# Patient Record
Sex: FEMALE | Race: WHITE | NOT HISPANIC OR LATINO | Employment: FULL TIME | URBAN - METROPOLITAN AREA
[De-identification: names, ages, dates, MRNs, and addresses within clinical notes are randomized per-mention and may not be internally consistent; named-entity substitution may affect disease eponyms.]

---

## 2017-11-22 LAB
INFLUENZA A ANTIGEN, POC: NEGATIVE
INFLUENZA B ANTIGEN, POC: NEGATIVE

## 2017-11-29 ENCOUNTER — HISTORICAL (OUTPATIENT)
Dept: ADMINISTRATIVE | Facility: HOSPITAL | Age: 62
End: 2017-11-29

## 2017-11-29 LAB
CHOLEST SERPL-MCNC: 155 MG/DL (ref 100–199)
HDLC SERPL-MCNC: 96 MG/DL
LDLC SERPL CALC-MCNC: 48 MG/DL (ref 0–99)
TRIGL SERPL-MCNC: 57 MG/DL (ref 0–149)
VLDLC SERPL CALC-MCNC: 11 MG/DL (ref 5–40)

## 2019-10-07 LAB
BUN SERPL-MCNC: 10 MG/DL (ref 7–18)
CREAT SERPL-MCNC: 1.04 MG/DL (ref 0.6–1.3)
GLUCOSE SERPL-MCNC: 91 MG/DL (ref 74–106)

## 2020-05-26 LAB
BUN SERPL-MCNC: 8.9 MG/DL (ref 9.8–20.1)
CALCIUM SERPL-MCNC: 8.8 MG/DL (ref 8.4–10.2)
CHLORIDE SERPL-SCNC: 106 MMOL/L (ref 98–107)
CO2 SERPL-SCNC: 31 MMOL/L (ref 23–31)
CREAT SERPL-MCNC: 0.89 MG/DL (ref 0.55–1.02)
CREAT/UREA NIT SERPL: 10
ERYTHROCYTE [DISTWIDTH] IN BLOOD BY AUTOMATED COUNT: 13.2 % (ref 11.5–17)
GLUCOSE SERPL-MCNC: 100 MG/DL (ref 82–115)
HCT VFR BLD AUTO: 39.7 % (ref 37–47)
HGB BLD-MCNC: 12.9 GM/DL (ref 12–16)
MCH RBC QN AUTO: 31 PG (ref 27–31)
MCHC RBC AUTO-ENTMCNC: 32.5 GM/DL (ref 33–36)
MCV RBC AUTO: 95.4 FL (ref 80–94)
PLATELET # BLD AUTO: 218 X10(3)/MCL (ref 130–400)
PMV BLD AUTO: 9.7 FL (ref 9.4–12.4)
POTASSIUM SERPL-SCNC: 4 MMOL/L (ref 3.5–5.1)
RBC # BLD AUTO: 4.16 X10(6)/MCL (ref 4.2–5.4)
SODIUM SERPL-SCNC: 143 MMOL/L (ref 136–145)
WBC # SPEC AUTO: 6.1 X10(3)/MCL (ref 4.5–11.5)

## 2020-05-29 ENCOUNTER — HISTORICAL (OUTPATIENT)
Dept: ADMINISTRATIVE | Facility: HOSPITAL | Age: 65
End: 2020-05-29

## 2020-05-29 LAB — SARS-COV-2 RNA RESP QL NAA+PROBE: NOT DETECTED

## 2020-07-16 ENCOUNTER — HISTORICAL (OUTPATIENT)
Dept: ADMINISTRATIVE | Facility: HOSPITAL | Age: 65
End: 2020-07-16

## 2020-09-10 ENCOUNTER — HISTORICAL (OUTPATIENT)
Dept: ADMINISTRATIVE | Facility: HOSPITAL | Age: 65
End: 2020-09-10

## 2021-02-02 ENCOUNTER — HISTORICAL (OUTPATIENT)
Dept: ADMINISTRATIVE | Facility: HOSPITAL | Age: 66
End: 2021-02-02

## 2021-02-02 LAB — VIT B12 SERPL-MCNC: 919 PG/ML (ref 213–816)

## 2021-02-05 ENCOUNTER — HISTORICAL (OUTPATIENT)
Dept: RADIOLOGY | Facility: HOSPITAL | Age: 66
End: 2021-02-05

## 2021-02-05 LAB — POC CREATININE: 1 MG/DL (ref 0.6–1.3)

## 2021-03-08 ENCOUNTER — HISTORICAL (OUTPATIENT)
Dept: ADMINISTRATIVE | Facility: HOSPITAL | Age: 66
End: 2021-03-08

## 2021-03-08 LAB
ABS NEUT (OLG): 2.42 X10(3)/MCL (ref 2.1–9.2)
ALBUMIN SERPL-MCNC: 4.2 GM/DL (ref 3.4–4.8)
ALBUMIN/GLOB SERPL: 1.8 RATIO (ref 1.1–2)
ALP SERPL-CCNC: 45 UNIT/L (ref 40–150)
ALT SERPL-CCNC: 25 UNIT/L (ref 0–55)
APPEARANCE, UA: ABNORMAL
AST SERPL-CCNC: 30 UNIT/L (ref 5–34)
BACTERIA SPEC CULT: ABNORMAL /HPF
BASOPHILS # BLD AUTO: 0 X10(3)/MCL (ref 0–0.2)
BASOPHILS NFR BLD AUTO: 1 %
BILIRUB SERPL-MCNC: 0.5 MG/DL
BILIRUB UR QL STRIP: NEGATIVE
BILIRUBIN DIRECT+TOT PNL SERPL-MCNC: 0.2 MG/DL (ref 0–0.5)
BILIRUBIN DIRECT+TOT PNL SERPL-MCNC: 0.3 MG/DL (ref 0–0.8)
BUN SERPL-MCNC: 9.6 MG/DL (ref 9.8–20.1)
CALCIUM SERPL-MCNC: 9.7 MG/DL (ref 8.4–10.2)
CHLORIDE SERPL-SCNC: 102 MMOL/L (ref 98–107)
CHOLEST SERPL-MCNC: 184 MG/DL
CHOLEST/HDLC SERPL: 2 {RATIO} (ref 0–5)
CO2 SERPL-SCNC: 33 MMOL/L (ref 23–31)
COLOR UR: YELLOW
CREAT SERPL-MCNC: 0.88 MG/DL (ref 0.55–1.02)
EOSINOPHIL # BLD AUTO: 0.2 X10(3)/MCL (ref 0–0.9)
EOSINOPHIL NFR BLD AUTO: 4 %
ERYTHROCYTE [DISTWIDTH] IN BLOOD BY AUTOMATED COUNT: 12.2 % (ref 11.5–17)
GLOBULIN SER-MCNC: 2.4 GM/DL (ref 2.4–3.5)
GLUCOSE (UA): NEGATIVE
GLUCOSE SERPL-MCNC: 86 MG/DL (ref 82–115)
HCT VFR BLD AUTO: 42.1 % (ref 37–47)
HCV AB SERPL QL IA: NONREACTIVE
HDLC SERPL-MCNC: 108 MG/DL (ref 35–60)
HGB BLD-MCNC: 13.7 GM/DL (ref 12–16)
HGB UR QL STRIP: NEGATIVE
KETONES UR QL STRIP: NEGATIVE
LDLC SERPL CALC-MCNC: 70 MG/DL (ref 50–140)
LEUKOCYTE ESTERASE UR QL STRIP: ABNORMAL
LYMPHOCYTES # BLD AUTO: 1.4 X10(3)/MCL (ref 0.6–4.6)
LYMPHOCYTES NFR BLD AUTO: 31 %
MCH RBC QN AUTO: 30.8 PG (ref 27–31)
MCHC RBC AUTO-ENTMCNC: 32.5 GM/DL (ref 33–36)
MCV RBC AUTO: 94.6 FL (ref 80–94)
MONOCYTES # BLD AUTO: 0.4 X10(3)/MCL (ref 0.1–1.3)
MONOCYTES NFR BLD AUTO: 10 %
NEUTROPHILS # BLD AUTO: 2.42 X10(3)/MCL (ref 2.1–9.2)
NEUTROPHILS NFR BLD AUTO: 54 %
NITRITE UR QL STRIP: NEGATIVE
PH UR STRIP: >=9 [PH] (ref 5–9)
PLATELET # BLD AUTO: 210 X10(3)/MCL (ref 130–400)
PMV BLD AUTO: 10.2 FL (ref 9.4–12.4)
POTASSIUM SERPL-SCNC: 4.2 MMOL/L (ref 3.5–5.1)
PROT SERPL-MCNC: 6.6 GM/DL (ref 5.8–7.6)
PROT UR QL STRIP: NEGATIVE
RBC # BLD AUTO: 4.45 X10(6)/MCL (ref 4.2–5.4)
RBC #/AREA URNS HPF: ABNORMAL /[HPF]
SODIUM SERPL-SCNC: 144 MMOL/L (ref 136–145)
SP GR UR STRIP: 1.02 (ref 1–1.03)
SQUAMOUS EPITHELIAL, UA: ABNORMAL
TRIGL SERPL-MCNC: 29 MG/DL (ref 37–140)
TSH SERPL-ACNC: 0.92 UIU/ML (ref 0.35–4.94)
UROBILINOGEN UR STRIP-ACNC: 1
VLDLC SERPL CALC-MCNC: 6 MG/DL
WBC # SPEC AUTO: 4.5 X10(3)/MCL (ref 4.5–11.5)
WBC #/AREA URNS HPF: 7 /HPF (ref 0–3)

## 2021-03-16 ENCOUNTER — HISTORICAL (OUTPATIENT)
Dept: RADIOLOGY | Facility: HOSPITAL | Age: 66
End: 2021-03-16

## 2021-03-18 LAB — BMD RECOMMENDATION EXT: NORMAL

## 2021-03-25 LAB — NONINV COLON CA DNA+OCC BLD SCRN STL QL: NEGATIVE

## 2021-08-02 LAB — BCS RECOMMENDATION EXT: NORMAL

## 2021-08-05 ENCOUNTER — HISTORICAL (OUTPATIENT)
Dept: RADIOLOGY | Facility: HOSPITAL | Age: 66
End: 2021-08-05

## 2021-08-05 LAB — POC CREATININE: 1.1 MG/DL (ref 0.6–1.3)

## 2022-03-16 ENCOUNTER — HISTORICAL (OUTPATIENT)
Dept: ADMINISTRATIVE | Facility: HOSPITAL | Age: 67
End: 2022-03-16

## 2022-03-16 LAB
ABS NEUT (OLG): 3.63 (ref 2.1–9.2)
ALBUMIN SERPL-MCNC: 4.2 G/DL (ref 3.4–4.8)
ALBUMIN/GLOB SERPL: 1.6 {RATIO} (ref 1.1–2)
ALP SERPL-CCNC: 61 U/L (ref 40–150)
ALT SERPL-CCNC: 23 U/L (ref 0–55)
APPEARANCE, UA: CLEAR
AST SERPL-CCNC: 28 U/L (ref 5–34)
BACTERIA SPEC CULT: NORMAL
BASOPHILS # BLD AUTO: 0 10*3/UL (ref 0–0.2)
BASOPHILS NFR BLD AUTO: 0 %
BILIRUB SERPL-MCNC: 0.6 MG/DL
BILIRUB UR QL STRIP: NEGATIVE
BILIRUBIN DIRECT+TOT PNL SERPL-MCNC: 0.2 (ref 0–0.5)
BILIRUBIN DIRECT+TOT PNL SERPL-MCNC: 0.4 (ref 0–0.8)
BUN SERPL-MCNC: 11.8 MG/DL (ref 9.8–20.1)
CALCIUM SERPL-MCNC: 9.8 MG/DL (ref 8.7–10.5)
CHLORIDE SERPL-SCNC: 105 MMOL/L (ref 98–107)
CHOLEST SERPL-MCNC: 173 MG/DL
CHOLEST/HDLC SERPL: 2 {RATIO} (ref 0–5)
CO2 SERPL-SCNC: 30 MMOL/L (ref 23–31)
COLOR UR: YELLOW
CREAT SERPL-MCNC: 0.82 MG/DL (ref 0.55–1.02)
DEPRECATED CALCIDIOL+CALCIFEROL SERPL-MC: 52.6 NG/ML (ref 30–80)
EOSINOPHIL # BLD AUTO: 0.2 10*3/UL (ref 0–0.9)
EOSINOPHIL NFR BLD AUTO: 3 %
ERYTHROCYTE [DISTWIDTH] IN BLOOD BY AUTOMATED COUNT: 12.7 % (ref 11.5–17)
EST. AVERAGE GLUCOSE BLD GHB EST-MCNC: 114 MG/DL
GLOBULIN SER-MCNC: 2.6 G/DL (ref 2.4–3.5)
GLUCOSE (UA): NEGATIVE
GLUCOSE SERPL-MCNC: 80 MG/DL (ref 82–115)
HBA1C MFR BLD: 5.6 %
HCT VFR BLD AUTO: 39.7 % (ref 37–47)
HDLC SERPL-MCNC: 101 MG/DL (ref 35–60)
HEMOLYSIS INTERF INDEX SERPL-ACNC: 4
HGB BLD-MCNC: 13.4 G/DL (ref 12–16)
HGB UR QL STRIP: NEGATIVE
ICTERIC INTERF INDEX SERPL-ACNC: 1
KETONES UR QL STRIP: NEGATIVE
LDLC SERPL CALC-MCNC: 62 MG/DL (ref 50–140)
LEUKOCYTE ESTERASE UR QL STRIP: NORMAL
LIPEMIC INTERF INDEX SERPL-ACNC: <0
LYMPHOCYTES # BLD AUTO: 2.3 10*3/UL (ref 0.6–4.6)
LYMPHOCYTES NFR BLD AUTO: 33 %
MANUAL DIFF? (OHS): NO
MCH RBC QN AUTO: 30.6 PG (ref 27–31)
MCHC RBC AUTO-ENTMCNC: 33.8 G/DL (ref 33–36)
MCV RBC AUTO: 90.6 FL (ref 80–94)
MONOCYTES # BLD AUTO: 0.8 10*3/UL (ref 0.1–1.3)
MONOCYTES NFR BLD AUTO: 11 %
NEUTROPHILS # BLD AUTO: 3.63 10*3/UL (ref 2.1–9.2)
NEUTROPHILS NFR BLD AUTO: 52 %
NITRITE UR QL STRIP: NEGATIVE
PH UR STRIP: 8 [PH] (ref 5–9)
PLATELET # BLD AUTO: 237 10*3/UL (ref 130–400)
PMV BLD AUTO: 10.1 FL (ref 9.4–12.4)
POTASSIUM SERPL-SCNC: 4.2 MMOL/L (ref 3.5–5.1)
PROT SERPL-MCNC: 6.8 G/DL (ref 5.8–7.6)
PROT UR QL STRIP: NEGATIVE
RBC # BLD AUTO: 4.38 10*6/UL (ref 4.2–5.4)
RBC #/AREA URNS HPF: NORMAL /[HPF] (ref 0–2)
SODIUM SERPL-SCNC: 144 MMOL/L (ref 136–145)
SP GR UR STRIP: 1.01 (ref 1–1.03)
SQUAMOUS EPITHELIAL, UA: NORMAL (ref 0–4)
TRIGL SERPL-MCNC: 52 MG/DL (ref 37–140)
TSH SERPL-ACNC: 2.09 M[IU]/L (ref 0.35–4.94)
UROBILINOGEN UR STRIP-ACNC: 0.2
VLDLC SERPL CALC-MCNC: 10 MG/DL
WBC # SPEC AUTO: 6.9 10*3/UL (ref 4.5–11.5)
WBC #/AREA URNS HPF: NORMAL /[HPF] (ref 0–2)

## 2022-04-10 ENCOUNTER — HISTORICAL (OUTPATIENT)
Dept: ADMINISTRATIVE | Facility: HOSPITAL | Age: 67
End: 2022-04-10
Payer: MEDICARE

## 2022-04-29 VITALS
BODY MASS INDEX: 22.92 KG/M2 | HEIGHT: 66 IN | WEIGHT: 142.63 LBS | SYSTOLIC BLOOD PRESSURE: 147 MMHG | DIASTOLIC BLOOD PRESSURE: 94 MMHG

## 2022-04-30 NOTE — OP NOTE
DATE OF SURGERY:    05/29/2020    SURGEON:  Adria Betts MD    PREOPERATIVE DIAGNOSIS:  Left extra-articular distal radius fracture.    POSTOPERATIVE DIAGNOSIS:  Left extra-articular distal radius fracture.    PROCEDURE:  Open reduction, internal fixation left extra-articular distal radius fracture.    ANESTHESIA:  General.    ESTIMATED BLOOD LOSS:  15 cc.    TOURNIQUET TIME:  18 minutes.    IMPLANTS:  Biomet DVR Crosslock plate with locking and nonlocking screws.    ASSISTANT:  Marah Zelaya nurse practitioner, necessary for a skilled set of hands to assist with reduction of the fracture as well as application of the hardware and deep closure.    COMPLICATIONS:  None.    COUNTS:  All counts correct x2 at the end of the case.    INDICATIONS FOR PROCEDURE:  The patient is a 64-year-old female who had a fall onto her outstretched upper extremity walking her dog.  She was seen and evaluated in the emergency department.  She was placed into a splint.  She followed up in clinic.  The risks and benefits of treatment were discussed.  She had a volar displaced extra-articular fracture.  She elected to undergo open reduction, internal fixation of her distal radius fracture.    PROCEDURE IN DETAIL:  After informed consent was obtained, the patient was met in the preoperative holding area.  Her site was marked.  She was taken to the operating room.  She was placed supine on the operating table.  General anesthesia was induced.  All bony prominences were well padded.  Preoperative antibiotics were given.  Her left upper extremity was prepped and draped in a standard sterile fashion.  A timeout was done to indicate the correct operative limb and procedure.  The limb was exsanguinated.  Tourniquet was raised.  A volar approach to the distal radius was performed.  It was carried down through the floor of the FCR tendon sheath, which was retracted ulnarward.  The pronator quadratus was lifted off the distal aspect of  the radius.  The fracture was identified.  Manipulation of the fracture was performed.  It was pulled out to length, restoring her alignment.  It was held in a reduced position by my assistant as a percutaneously applied K-wire was used for provisional stabilization.  A volar plate was applied.  It was positioned.  It was confirmed to be in appropriate position on AP and lateral fluoroscopic images.  A nonlocking screw was placed into the shaft, buttressing against the fracture, holding it in a well reduced position.  A nonlocking screw was placed into the distal segment, bringing the plate down to bone.  Both had excellent purchase.  Multiple locking screws were then placed into the distal segment followed by further shaft screws.  Alignment was confirmed to be appropriate on AP and lateral fluoroscopic images as well as an axial view.  Tourniquet was released.  Hemostasis was obtained.  The wound was irrigated and closed using a 2-0 Vicryl and 3-0 nylon.  Xeroform, 4x4s, cast padding, and a volar resting splint were applied.  The patient was awakened, extubated, and taken to recovery in stable condition.    POSTOPERATIVE PLAN:  She will be discharged home today, nonweightbearing to left upper extremity.  She can keep her limb elevated, perform range of motion of her digits.  We will see her back in 2 weeks.        ______________________________  MD MERLENE Johnson//VENITA  DD:  05/29/2020  Time:  08:10AM  DT:  05/29/2020  Time:  08:52AM  Job #:  [id number]

## 2022-06-09 ENCOUNTER — TELEPHONE (OUTPATIENT)
Dept: FAMILY MEDICINE | Facility: CLINIC | Age: 67
End: 2022-06-09
Payer: MEDICARE

## 2022-06-09 DIAGNOSIS — Z12.31 BREAST CANCER SCREENING BY MAMMOGRAM: Primary | ICD-10-CM

## 2022-06-09 NOTE — TELEPHONE ENCOUNTER
----- Message from Irma Lagos MA sent at 2022  1:38 PM CDT -----  Regarding: FW: MM orders    ----- Message -----  From: Keena Valenzuela  Sent: 2022  12:58 PM CDT  To: [facility] Staff  Subject: MM orders                                        Pt is requesting new MM order to be sent to [facility]. She stated the order sent will  before her visit which is 2022.

## 2022-06-09 NOTE — TELEPHONE ENCOUNTER
Mammo faxed         ----- Message from JASPER Olivares sent at 2022  2:23 PM CDT -----  Regarding: RE: MM orders  Orders placed - please fax  ----- Message -----  From: Irma Lagos MA  Sent: 2022   1:38 PM CDT  To: JASPER Olivares  Subject: FW: MM orders                                      ----- Message -----  From: Keena Valenzuela  Sent: 2022  12:58 PM CDT  To: [facility] Staff  Subject: MM orders                                        Pt is requesting new MM order to be sent to [facility] She stated the order sent will  before her visit which is 2022.

## 2022-07-25 RX ORDER — CARBIDOPA AND LEVODOPA 25; 100 MG/1; MG/1
TABLET ORAL
Qty: 135 TABLET | Refills: 1 | Status: SHIPPED | OUTPATIENT
Start: 2022-07-25 | End: 2022-08-22

## 2022-07-25 NOTE — TELEPHONE ENCOUNTER
Ccan you check to make sure her sinemet refill request just went thought, it said I needed to input her address before it would go through. If not could you add her address?

## 2022-08-02 ENCOUNTER — DOCUMENTATION ONLY (OUTPATIENT)
Dept: ADMINISTRATIVE | Facility: HOSPITAL | Age: 67
End: 2022-08-02
Payer: MEDICARE

## 2022-09-15 ENCOUNTER — HISTORICAL (OUTPATIENT)
Dept: ADMINISTRATIVE | Facility: HOSPITAL | Age: 67
End: 2022-09-15
Payer: MEDICARE

## 2022-09-15 ENCOUNTER — OFFICE VISIT (OUTPATIENT)
Dept: NEUROLOGY | Facility: CLINIC | Age: 67
End: 2022-09-15
Payer: MEDICARE

## 2022-09-15 VITALS
HEART RATE: 76 BPM | WEIGHT: 153 LBS | SYSTOLIC BLOOD PRESSURE: 132 MMHG | HEIGHT: 66 IN | DIASTOLIC BLOOD PRESSURE: 88 MMHG | BODY MASS INDEX: 24.59 KG/M2

## 2022-09-15 DIAGNOSIS — G20.A1 PARKINSON'S DISEASE: Primary | ICD-10-CM

## 2022-09-15 DIAGNOSIS — F51.8 ABNORMAL DREAMS: ICD-10-CM

## 2022-09-15 DIAGNOSIS — R41.89 COGNITIVE IMPAIRMENT: ICD-10-CM

## 2022-09-15 PROCEDURE — 99214 PR OFFICE/OUTPT VISIT, EST, LEVL IV, 30-39 MIN: ICD-10-PCS | Mod: S$PBB,,, | Performed by: PSYCHIATRY & NEUROLOGY

## 2022-09-15 PROCEDURE — 99214 OFFICE O/P EST MOD 30 MIN: CPT | Mod: PBBFAC | Performed by: PSYCHIATRY & NEUROLOGY

## 2022-09-15 PROCEDURE — 99214 OFFICE O/P EST MOD 30 MIN: CPT | Mod: S$PBB,,, | Performed by: PSYCHIATRY & NEUROLOGY

## 2022-09-15 PROCEDURE — 99999 PR PBB SHADOW E&M-EST. PATIENT-LVL IV: CPT | Mod: PBBFAC,,, | Performed by: PSYCHIATRY & NEUROLOGY

## 2022-09-15 PROCEDURE — 99999 PR PBB SHADOW E&M-EST. PATIENT-LVL IV: ICD-10-PCS | Mod: PBBFAC,,, | Performed by: PSYCHIATRY & NEUROLOGY

## 2022-09-15 RX ORDER — DONEPEZIL HYDROCHLORIDE 10 MG/1
1 TABLET, FILM COATED ORAL NIGHTLY
COMMUNITY
Start: 2021-11-30 | End: 2023-12-18 | Stop reason: SDUPTHER

## 2022-09-15 RX ORDER — BREXPIPRAZOLE 2 MG/1
1 TABLET ORAL NIGHTLY
COMMUNITY
Start: 2022-08-30

## 2022-09-15 RX ORDER — CELECOXIB 100 MG/1
100 CAPSULE ORAL 2 TIMES DAILY PRN
COMMUNITY
Start: 2022-08-20

## 2022-09-15 RX ORDER — MEMANTINE HYDROCHLORIDE 10 MG/1
1 TABLET ORAL 2 TIMES DAILY
COMMUNITY
Start: 2021-12-15 | End: 2023-12-18 | Stop reason: SDUPTHER

## 2022-09-15 RX ORDER — ALPRAZOLAM 1 MG/1
1 TABLET ORAL 2 TIMES DAILY PRN
COMMUNITY
Start: 2022-09-01

## 2022-09-15 RX ORDER — FUROSEMIDE 20 MG/1
10-20 TABLET ORAL DAILY PRN
COMMUNITY
Start: 2022-06-15 | End: 2023-04-11 | Stop reason: SDUPTHER

## 2022-09-15 RX ORDER — ACETAMINOPHEN, DIPHENHYDRAMINE HCL, PHENYLEPHRINE HCL 325; 25; 5 MG/1; MG/1; MG/1
1 TABLET ORAL
COMMUNITY

## 2022-09-15 RX ORDER — CARBIDOPA AND LEVODOPA 25; 100 MG/1; MG/1
1.5 TABLET ORAL 3 TIMES DAILY
COMMUNITY
Start: 2021-08-31 | End: 2022-09-16

## 2022-09-15 RX ORDER — DEXTROAMPHETAMINE SACCHARATE, AMPHETAMINE ASPARTATE, DEXTROAMPHETAMINE SULFATE AND AMPHETAMINE SULFATE 5; 5; 5; 5 MG/1; MG/1; MG/1; MG/1
1 TABLET ORAL 3 TIMES DAILY
COMMUNITY
Start: 2022-09-01

## 2022-09-15 NOTE — PROGRESS NOTES
Chief Complaint   Patient presents with    Tremors     At first after increasing Carbidopa/levo to 1.5 tab walking has gotten better, but last couple of weeks tremors to mouth has been bad. Hand writing has gotten a little worse. Finds her walking has slowed down a little bit. Denies any falls or difficulty swallowing. Still having vivid dreams        This is a 66 y.o. female here for follow up for Parkinson's disease, tardive dyskinesia, cognitive impairment.  Patient is doing well.  She takes Sinemet 1.5 tabs 3 times a day which she does feel helps with her tremor.  At times tremor and that lips and jaw is worse.  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  Some days are worse than others.  She does take the Adderall every day although we have instructed her in the past to try to cut down as it could be worsening tremor.  She has noticed that her handwriting is smaller and Messier.  She denies any issues with her walking or balance.    Medication List with Changes/Refills   Current Medications    ALPRAZOLAM (XANAX) 1 MG TABLET    Take 1 mg by mouth nightly as needed.    CARBIDOPA-LEVODOPA  MG (SINEMET)  MG PER TABLET    Take 1.5 tablets by mouth 3 (three) times daily.    CELECOXIB (CELEBREX) 100 MG CAPSULE    Take 100 mg by mouth 2 (two) times daily as needed.    DEXTROAMPHETAMINE-AMPHETAMINE (ADDERALL) 20 MG TABLET    Take 1 tablet by mouth 3 (three) times daily.    DONEPEZIL (ARICEPT) 10 MG TABLET    Take 1 tablet by mouth nightly.    FUROSEMIDE (LASIX) 20 MG TABLET    Take 10-20 mg by mouth daily as needed.    MAGNESIUM ORAL    Take 1 tablet by mouth Daily.    MELATONIN 10 MG TAB    Take 1 tablet by mouth as needed.    MEMANTINE (NAMENDA) 10 MG TAB    Take 1 tablet by mouth 2 (two) times daily.    REXULTI 2 MG TAB    Take 1 tablet by mouth every evening.   Discontinued Medications    CARBIDOPA-LEVODOPA  MG (SINEMET)  MG PER TABLET    TAKE 1 AND 1/2 TABLETS BY  MOUTH 3 TIMES A DAY        Vitals:    09/15/22 0845   BP: 132/88   Pulse: 76        General: alert and oriented, No acute distress, no audible wheezes pulse intact. No edema    Cognition and Comprehension:  Speech and language intact.  follow commands  Cranial Nerves:  II. Optic: Visual fields (Full to confrontation both eyes).  III, IV, VI. Oculomotor: Intact, Pupils equal, round and reactive to light, no nystagmus  V. Trigeminal: Sensation of light touch (Normal)  VII. Facial: no facial asymmetry.  VIII, hearing intact to spoken voice  IX/X. Glossopharyngeal/ Vagus: Voice (normal).  XI. shoulder shrug normal  XII. Hypoglossal: Intact.    Very mild high frequency jaw tremor, worse with distraction during SONYA but also noted infrequently at rest    Muscle Strength & Tone:  Normal upper extremity tone,  Normal lower extremity tone.  Normal upper extremity strength  normal lower extremity strength  No bradykinesia noted in RUE, very mild bradykinesia in LUE  No rest tremor in hands noted today  BUE symmetric high frequency tremor with action  No rigidity BUE    Coordination and Gait  Coordination and gait are normal.  Gait normal     1. Parkinson's disease  Overview:  + RAMIRO scan    Assessment & Plan:  Cont sinemet 1.5 TID  Consider reduce Adderall could be making tremor worse      2. Abnormal dreams  Assessment & Plan:  Move aricept to morning      3. 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 Doing ok on sinemet will continue

## 2022-09-16 ENCOUNTER — DOCUMENTATION ONLY (OUTPATIENT)
Dept: ADMINISTRATIVE | Facility: HOSPITAL | Age: 67
End: 2022-09-16
Payer: MEDICARE

## 2022-09-16 ENCOUNTER — HISTORICAL (OUTPATIENT)
Dept: ADMINISTRATIVE | Facility: HOSPITAL | Age: 67
End: 2022-09-16
Payer: MEDICARE

## 2022-09-16 DIAGNOSIS — G20.A1 PARKINSON'S DISEASE: Primary | ICD-10-CM

## 2022-09-16 LAB — BCS RECOMMENDATION EXT: NORMAL

## 2022-09-16 RX ORDER — CARBIDOPA AND LEVODOPA 25; 100 MG/1; MG/1
TABLET ORAL
Qty: 135 TABLET | Refills: 1 | Status: SHIPPED | OUTPATIENT
Start: 2022-09-16 | End: 2022-10-10 | Stop reason: SDUPTHER

## 2022-10-10 DIAGNOSIS — G20.A1 PARKINSON'S DISEASE: ICD-10-CM

## 2022-10-10 RX ORDER — CARBIDOPA AND LEVODOPA 25; 100 MG/1; MG/1
TABLET ORAL
Qty: 135 TABLET | Refills: 4 | OUTPATIENT
Start: 2022-10-10 | End: 2022-11-30 | Stop reason: SDUPTHER

## 2022-11-30 DIAGNOSIS — G20.A1 PARKINSON'S DISEASE: ICD-10-CM

## 2022-11-30 RX ORDER — CARBIDOPA AND LEVODOPA 25; 100 MG/1; MG/1
TABLET ORAL
Qty: 135 TABLET | Refills: 4 | OUTPATIENT
Start: 2022-11-30 | End: 2023-04-13

## 2023-03-15 ENCOUNTER — OFFICE VISIT (OUTPATIENT)
Dept: FAMILY MEDICINE | Facility: CLINIC | Age: 68
End: 2023-03-15
Payer: MEDICARE

## 2023-03-15 VITALS
BODY MASS INDEX: 24.65 KG/M2 | RESPIRATION RATE: 16 BRPM | OXYGEN SATURATION: 97 % | HEART RATE: 64 BPM | WEIGHT: 153.38 LBS | DIASTOLIC BLOOD PRESSURE: 77 MMHG | TEMPERATURE: 98 F | SYSTOLIC BLOOD PRESSURE: 125 MMHG | HEIGHT: 66 IN

## 2023-03-15 DIAGNOSIS — I25.10 ARTERIOSCLEROSIS OF CORONARY ARTERY: Chronic | ICD-10-CM

## 2023-03-15 DIAGNOSIS — Z78.0 POST-MENOPAUSAL: Chronic | ICD-10-CM

## 2023-03-15 DIAGNOSIS — R73.03 PREDIABETES: ICD-10-CM

## 2023-03-15 DIAGNOSIS — Z12.31 BREAST CANCER SCREENING BY MAMMOGRAM: ICD-10-CM

## 2023-03-15 DIAGNOSIS — Z00.00 WELLNESS EXAMINATION: Primary | ICD-10-CM

## 2023-03-15 DIAGNOSIS — M62.89 MUSCLE STIFFNESS: ICD-10-CM

## 2023-03-15 DIAGNOSIS — F32.A DEPRESSIVE DISORDER: ICD-10-CM

## 2023-03-15 PROBLEM — I34.0 MITRAL VALVE INSUFFICIENCY: Status: ACTIVE | Noted: 2023-03-15

## 2023-03-15 PROBLEM — M85.80 OSTEOPENIA: Status: ACTIVE | Noted: 2023-03-15

## 2023-03-15 PROBLEM — F51.8 ABNORMAL DREAMS: Chronic | Status: ACTIVE | Noted: 2022-09-15

## 2023-03-15 PROBLEM — F02.80 ALZHEIMER'S DISEASE: Status: ACTIVE | Noted: 2023-03-15

## 2023-03-15 PROBLEM — G20.A1 PARKINSON'S DISEASE: Chronic | Status: ACTIVE | Noted: 2022-09-15

## 2023-03-15 PROBLEM — K21.9 GASTROESOPHAGEAL REFLUX DISEASE: Chronic | Status: ACTIVE | Noted: 2023-03-15

## 2023-03-15 PROBLEM — F44.9 DISSOCIATIVE DISORDER: Status: ACTIVE | Noted: 2023-03-15

## 2023-03-15 PROBLEM — F02.80 ALZHEIMER'S DISEASE: Chronic | Status: ACTIVE | Noted: 2023-03-15

## 2023-03-15 PROBLEM — R51.9 HEADACHE: Status: ACTIVE | Noted: 2023-03-15

## 2023-03-15 PROBLEM — F41.1 GENERALIZED ANXIETY DISORDER: Chronic | Status: ACTIVE | Noted: 2023-03-15

## 2023-03-15 PROBLEM — F41.1 GENERALIZED ANXIETY DISORDER: Status: ACTIVE | Noted: 2023-03-15

## 2023-03-15 PROBLEM — R41.89 COGNITIVE IMPAIRMENT: Chronic | Status: ACTIVE | Noted: 2022-09-15

## 2023-03-15 PROBLEM — M50.30 DEGENERATION OF INTERVERTEBRAL DISC OF CERVICAL REGION: Status: ACTIVE | Noted: 2023-03-15

## 2023-03-15 PROBLEM — G30.9 ALZHEIMER'S DISEASE: Status: ACTIVE | Noted: 2023-03-15

## 2023-03-15 PROBLEM — M85.80 OSTEOPENIA: Chronic | Status: ACTIVE | Noted: 2023-03-15

## 2023-03-15 PROBLEM — G30.9 ALZHEIMER'S DISEASE: Chronic | Status: ACTIVE | Noted: 2023-03-15

## 2023-03-15 PROBLEM — M12.9 ARTHROPATHY: Status: ACTIVE | Noted: 2023-03-15

## 2023-03-15 PROBLEM — M50.30 DEGENERATION OF INTERVERTEBRAL DISC OF CERVICAL REGION: Chronic | Status: ACTIVE | Noted: 2023-03-15

## 2023-03-15 PROBLEM — K21.9 GASTROESOPHAGEAL REFLUX DISEASE: Status: ACTIVE | Noted: 2023-03-15

## 2023-03-15 PROBLEM — F44.9 DISSOCIATIVE DISORDER: Chronic | Status: ACTIVE | Noted: 2023-03-15

## 2023-03-15 PROBLEM — I34.0 MITRAL VALVE INSUFFICIENCY: Chronic | Status: ACTIVE | Noted: 2023-03-15

## 2023-03-15 PROBLEM — R51.9 HEADACHE: Chronic | Status: ACTIVE | Noted: 2023-03-15

## 2023-03-15 PROCEDURE — G0439 PPPS, SUBSEQ VISIT: HCPCS | Mod: ,,, | Performed by: STUDENT IN AN ORGANIZED HEALTH CARE EDUCATION/TRAINING PROGRAM

## 2023-03-15 PROCEDURE — G0439 PR MEDICARE ANNUAL WELLNESS SUBSEQUENT VISIT: ICD-10-PCS | Mod: ,,, | Performed by: STUDENT IN AN ORGANIZED HEALTH CARE EDUCATION/TRAINING PROGRAM

## 2023-03-15 RX ORDER — CYCLOBENZAPRINE HCL 5 MG
5 TABLET ORAL NIGHTLY PRN
Qty: 10 TABLET | Refills: 0 | Status: SHIPPED | OUTPATIENT
Start: 2023-03-15 | End: 2023-03-25

## 2023-03-15 NOTE — PROGRESS NOTES
Patient ID: [id number]     Chief Complaint: Medicare AWV Follow Up        HPI:      Disclaimer:  This note is prepared using voice recognition software and as such is likely to have errors despite attempts at proofreading. Please contact me for questions.     Beverly Shah is a 67 y.o. female here today for a Medicare Wellness. No other complaints today.  Overall feeling great.  Recently started physical therapy for Parkinson's disease and feels great.  Quest for some p.r.n. Flexeril because of unknown muscle stiffness due to Parkinson's    A separate E/M code has been provided to evaluate additional complaints that the patient would like addressed during the dedicated Medicare Wellness Exam.      Chronic Issues-    Alzheimer's disease  Was diagnosed almost 5 years ago   Following Dr. Diana Robbins next 9   currently on memantine 10 mg once daily and Aricept 10 mg once nightly  Stable    Parkinson's  Patient is currently on levodopa carbidopa and is stable    Anxiety disorder, unspecified  Bipolar disorder, unspecified  Depression  Currently on Xanax 1 mg p.r.n. nightly, Rexulti 2 mg nightly  Overall feels great denies of any SI/HI    CAD (coronary artery disease)  Has a history of mitral valve prolapse   Following Dr. Matt Oslon   Currently not on any statins.    GERD (gastroesophageal reflux disease)  Stable  Not on protonix    Rheumatoid arthritis, unspecified  DDD (degenerative disc disease), cervical  Following Dr. Miranda    Vitamin D deficiency   Taking over-the-counter vitamin-D supplementations 300 IU every day.    Past Surgical History:   Procedure Laterality Date    Left ankle surgery Right     Left wrist surgery Left        Review of patient's allergies indicates:  No Known Allergies    Outpatient Medications Marked as Taking for the 3/15/23 encounter (Office Visit) with Vanessa Guardado MD   Medication Sig Dispense Refill    ALPRAZolam (XANAX) 1 MG tablet Take 1 mg by mouth nightly as needed.       carbidopa-levodopa  mg (SINEMET)  mg per tablet TAKE 1 AND 1/2 TABLETS BY MOUTH 3 TIMES A DAY  Strength:  mg 135 tablet 4    celecoxib (CELEBREX) 100 MG capsule Take 100 mg by mouth 2 (two) times daily as needed.      dextroamphetamine-amphetamine (ADDERALL) 20 mg tablet Take 1 tablet by mouth 3 (three) times daily.      donepeziL (ARICEPT) 10 MG tablet Take 1 tablet by mouth nightly.      furosemide (LASIX) 20 MG tablet Take 10-20 mg by mouth daily as needed.      MAGNESIUM ORAL Take 1 tablet by mouth Daily.      melatonin 10 mg Tab Take 1 tablet by mouth as needed.      memantine (NAMENDA) 10 MG Tab Take 1 tablet by mouth 2 (two) times daily.      REXULTI 2 mg Tab Take 1 tablet by mouth every evening.         Social History     Socioeconomic History    Marital status:    Tobacco Use    Smoking status: Never     Passive exposure: Never    Smokeless tobacco: Never   Substance and Sexual Activity    Alcohol use: Not Currently    Drug use: Never    Sexual activity: Not Currently        Family History   Problem Relation Age of Onset    Alzheimer's disease Mother     Heart disease Father         Patient Care Team:  Vanessa Guardado MD as PCP - General (Family Medicine)  Vanessa Guardado MD as PCP - Family Medicine (Family Medicine)       Subjective:     ROS    See HPI for details    Constitutional: Denies Change in appetite. Denies Chills. Denies Fever. Denies Night sweats.  Eye: Denies Blurred vision. Denies Discharge. Denies Eye pain.  ENT: Denies Decreased hearing. Denies Sore throat. Denies Swollen glands.  Respiratory: Denies Cough. Denies Shortness of breath. Denies Shortness of breath with exertion. Denies Wheezing.  Cardiovascular: DeniesChest pain at rest. Denies Chest pain with exertion. Denies Irregular heartbeat. Denies Palpitations. Denies Edema.  Gastrointestinal: Denies Abdominal pain. DeniesDiarrhea. Denies Nausea. Denies Vomiting. Denies Hematemesis or  "Hematochezia.  Genitourinary: Denies Dysuria. Denies Urinary frequency. Denies Urinary urgency. Denies Blood in urine.  Endocrine: Denies Cold intolerance. Denies Excessive thirst. Denies Heat intolerance. Denies Weight loss. Denies Weight gain.  Musculoskeletal: Denies Painful joints. Denies Weakness.  Integumentary: Denies Rash. Denies Itching. Denies Dry skin.  Neurologic: Denies Dizziness. Denies Fainting. Denies Headache.  Psychiatric: Denies Depression. Denies Anxiety. Denies Suicidal/Homicidal ideations.    All Other ROS: Negative except as stated in HPI.     Patient Reported Health Risk Assessments:       Objective:     /77 (BP Location: Right arm, Patient Position: Sitting, BP Method: Medium (Automatic))   Pulse 64   Temp 97.9 °F (36.6 °C) (Oral)   Resp 16   Ht 5' 6" (1.676 m)   Wt 69.6 kg (153 lb 6.4 oz)   SpO2 97%   BMI 24.76 kg/m²     Physical Exam    General: Alert and oriented, No acute distress.  Head: Normocephalic, Atraumatic.  Eye: Pupils are equal, round and reactive to light, Extraocular movements are intact, Sclera non-icteric.  Ears/Nose/Throat: Normal, Mucosa moist,Clear.  Neck/Thyroid: Supple, Non-tender, No carotid bruit, No palpable thyromegaly or thyroid nodule, No lymphadenopathy, No JVD, Full range of motion.  Respiratory: Clear to auscultation bilaterally; No wheezes, rales or rhonchi, Non-labored respirations, Symmetrical chest wall expansion.  Cardiovascular: Regular rate and rhythm, S1/S2 normal, No murmurs, rubs or gallops.  Gastrointestinal: Soft, Non-tender, Non-distended, Normal bowel sounds, No palpable organomegaly.  Musculoskeletal: Normal range of motion.  Integumentary: Warm, Dry, Intact, No suspicious lesions or rashes.  Extremities: No clubbing, cyanosis or edema  Neurologic: No focal deficits, Cranial Nerves II-XII are grossly intact, Motor strength normal upper and lower extremities, Sensory exam intact.  Psychiatric: Normal interaction, Coherent speech, " Euthymic mood, Appropriate affect       Assessment:       ICD-10-CM ICD-9-CM   1. Wellness examination  Z00.00 V70.0   2. Depressive disorder  F32.A 311   3. Post-menopausal  Z78.0 V49.81   4. Prediabetes  R73.03 790.29   5. Arteriosclerosis of coronary artery  I25.10 414.00        Plan:       Medicare Annual Wellness and Personalized Prevention Plan:     Fall Risk + Home Safety + Living Situation + Whisper Test + Depression Screen + CAGE + Cognitive Impairment Screen + ADL Screen + Timed Get Up and Go + Nutrition Screen + PAQ Screen + Health Risk Assessment all reviewed.     No flowsheet data found.  Fall Risk Assessment - Outpatient 3/15/2023 9/15/2022   Mobility Status Ambulatory Ambulatory   Number of falls 0 0   Identified as fall risk 0 0       Alcohol/Tobacco Use - Denies of smoking cessation and alcohol intake.  CVD Risk Factors - Reviewed  Obesity/Physical Activity - Normal BMI. Encouraged daily 30 minute physical activity x 5 days per week.    Opioid Screening: Patient medication list reviewed, patient is not taking prescription opioids. Patient is not using additional opioids than prescribed. Patient is at low risk of substance abuse based on this opioid use history.         Health Maintenance Topics with due status: Not Due       Topic Last Completion Date    Colorectal Cancer Screening 03/25/2021    TETANUS VACCINE 02/15/2022    Lipid Panel 03/16/2022    Mammogram 09/16/2022       Vaccinations -   Immunization History   Administered Date(s) Administered    COVID-19, MRNA, LN-S, PF (MODERNA FULL 0.5 ML DOSE) 02/09/2021, 02/09/2021, 03/09/2021, 03/09/2021    COVID-19, MRNA, LN-S, PF (Pfizer) (Gray Cap) 04/06/2022    COVID-19, MRNA, LN-S, PF (Pfizer) (Purple Cap) 11/04/2021    COVID-19, mRNA, LNP-S, bivalent booster, PF (PFIZER OMICRON) 09/28/2022    Influenza 10/29/2007, 10/23/2013    Influenza - Quadrivalent 11/05/2015, 11/05/2015    Influenza - Quadrivalent - High Dose - PF (65 years and older)  09/29/2021, 09/28/2022    Influenza - Quadrivalent - MDCK - PF 10/14/2017, 10/06/2018, 09/28/2019    Influenza - Trivalent - PF (ADULT) 10/29/2007, 10/23/2013, 10/28/2014, 11/21/2016, 10/01/2017    Influenza A (H1N1) 2009 Monovalent - IM 01/12/2010    Pneumococcal Conjugate - 13 Valent 03/11/2021    Pneumococcal Polysaccharide - 23 Valent 01/01/2012, 03/14/2022    Tdap 01/01/2014, 05/24/2020, 02/15/2022    Zoster 10/20/2021, 12/28/2021    Zoster Recombinant 10/20/2021, 12/28/2021        Advance Directives:   Living Will: No        Oral Declaration: No    [place]POST: No    Do Not Resuscitate Status: No (Full Code. Madelaine Castellon- [id number])      Decision Making:  Patient answered questions  Goals of Care: The patient endorses that what is most important right now is to focus on comfort and QOL with grand children     Accordingly, we have decided that the best plan to meet the patient's goals includes continuing with treatment  Advance Care Planning   I attest to discussing Advance Care Planning with patient and/or family member.  Education was provided including the importance of the Health Care Power of , Advance Directives, and/or [place]POST documentation.  The patient expressed understanding to the importance of this information and discussion.  Goals of Care: The patient expressed that what is most important right now is to focus on:   Length of ACP conversation in minutes: 5 min         1. Wellness examination     Lung Cancer-(50-80) Smoker/ Ex smoker- Never smoker  Cervical Cancer Screening (21-65)- Aged out   Breast Cancer Screening (40-74)-Mammogram was wnl . Next due on 9/16/23   Colon Cancer Screening(45-75) - Colonoscopy declined. Color guard was wnl. Next due in 2024  Osteoporosis Screening(>65) - Last DEXA last year at Dr. Miranda.  Reports that she was on Fosamax but Dr. Miranda took her off of Fosamax.  Currently not on any medication.   Eye Exam - Recommend Eye Exam annually   Dental Exam -  Recommend biannually    2. Depressive disorder  Rec to keep scheduled visit with Dr. Robbins  PHQ-9- 3   ER precautions given  - CBC Auto Differential; Future  - Comprehensive Metabolic Panel; Future  - Lipid Panel; Future  - Urinalysis; Future  - Urinalysis    3. Post-menopausal  Will get the DEXA scan reports from Dr. Miranda office   Recommend to start Caltrate OTC    4. Prediabetes  Hemoglobin A1c   Date Value Ref Range Status   03/16/2022 5.6 <=7.0        Follow ADA Diet. Avoid soda, simple sweets, and limit rice/pasta/breads/starches (no more than 45-50 grams per meal).  Maintain healthy weight with goal BMI <30.  Exercise 5 times per week for 30 minutes per day.  Stressed importance of daily foot exams.  Stressed importance of annual dilated eye exam.  - TSH; Future  - Hemoglobin A1C; Future    5. Arteriosclerosis of coronary artery  - Lipid Panel; Future  - keep scheduled follow-up with cardiologist    6. Breast cancer screening by mammogram  - Mammo Digital Screening Bilat w/ Cecil; Future    7. Muscle stiffness  - cyclobenzaprine (FLEXERIL) 5 MG tablet; Take 1 tablet (5 mg total) by mouth nightly as needed for Muscle spasms.  Dispense: 10 tablet; Refill: 0        Medication List with Changes/Refills   Current Medications    ALPRAZOLAM (XANAX) 1 MG TABLET    Take 1 mg by mouth nightly as needed.       Start Date: 9/1/2022  End Date: --    CARBIDOPA-LEVODOPA  MG (SINEMET)  MG PER TABLET    TAKE 1 AND 1/2 TABLETS BY MOUTH 3 TIMES A DAY  Strength:  mg       Start Date: 11/30/2022End Date: --    CARBIDOPA-LEVODOPA  MG (SINEMET)  MG PER TABLET    TAKE 1 AND 1/2 TABLETS BY MOUTH 3 TIMES A DAY       Start Date: 1/23/2023 End Date: --    CELECOXIB (CELEBREX) 100 MG CAPSULE    Take 100 mg by mouth 2 (two) times daily as needed.       Start Date: 8/20/2022 End Date: --    DEXTROAMPHETAMINE-AMPHETAMINE (ADDERALL) 20 MG TABLET    Take 1 tablet by mouth 3 (three) times daily.       Start  Date: 9/1/2022  End Date: --    DONEPEZIL (ARICEPT) 10 MG TABLET    Take 1 tablet by mouth nightly.       Start Date: 11/30/2021End Date: --    FUROSEMIDE (LASIX) 20 MG TABLET    Take 10-20 mg by mouth daily as needed.       Start Date: 6/15/2022 End Date: --    MAGNESIUM ORAL    Take 1 tablet by mouth Daily.       Start Date: 11/16/2021End Date: --    MELATONIN 10 MG TAB    Take 1 tablet by mouth as needed.       Start Date: --        End Date: --    MEMANTINE (NAMENDA) 10 MG TAB    Take 1 tablet by mouth 2 (two) times daily.       Start Date: 12/15/2021End Date: --    REXULTI 2 MG TAB    Take 1 tablet by mouth every evening.       Start Date: 8/30/2022 End Date: --          The patient's Health Maintenance was reviewed and the following appears to be due at this time:   Health Maintenance Due   Topic Date Due    High Dose Statin  Never done    DEXA Scan  03/18/2023           Follow up in about 6 months (around 9/15/2023) for Follow Up depression. 1 year for Aw. In addition to their scheduled follow up, the patient has also been instructed to follow up on as needed basis.     Provided patient with a 5-10 year written screening schedule and personal prevention plan. Recommendations were developed using the USPSTF age appropriate recommendations. Education, counseling, and referrals were provided as needed. After Visit Summary printed and given to patient which includes a list of additional screenings\tests needed.

## 2023-03-16 ENCOUNTER — OFFICE VISIT (OUTPATIENT)
Dept: NEUROLOGY | Facility: CLINIC | Age: 68
End: 2023-03-16
Payer: MEDICARE

## 2023-03-16 VITALS
BODY MASS INDEX: 24.75 KG/M2 | HEART RATE: 72 BPM | WEIGHT: 154 LBS | SYSTOLIC BLOOD PRESSURE: 130 MMHG | HEIGHT: 66 IN | DIASTOLIC BLOOD PRESSURE: 74 MMHG

## 2023-03-16 DIAGNOSIS — R41.89 COGNITIVE IMPAIRMENT: Chronic | ICD-10-CM

## 2023-03-16 DIAGNOSIS — G20.A1 PARKINSON'S DISEASE: Primary | Chronic | ICD-10-CM

## 2023-03-16 PROCEDURE — 99213 OFFICE O/P EST LOW 20 MIN: CPT | Mod: PBBFAC | Performed by: NURSE PRACTITIONER

## 2023-03-16 PROCEDURE — 99999 PR PBB SHADOW E&M-EST. PATIENT-LVL III: ICD-10-PCS | Mod: PBBFAC,,, | Performed by: NURSE PRACTITIONER

## 2023-03-16 PROCEDURE — 99213 PR OFFICE/OUTPT VISIT, EST, LEVL III, 20-29 MIN: ICD-10-PCS | Mod: S$PBB,,, | Performed by: NURSE PRACTITIONER

## 2023-03-16 PROCEDURE — 99213 OFFICE O/P EST LOW 20 MIN: CPT | Mod: S$PBB,,, | Performed by: NURSE PRACTITIONER

## 2023-03-16 PROCEDURE — 99999 PR PBB SHADOW E&M-EST. PATIENT-LVL III: CPT | Mod: PBBFAC,,, | Performed by: NURSE PRACTITIONER

## 2023-03-16 NOTE — PROGRESS NOTES
Subjective:       Patient ID: Beverly Shah is a 67 y.o. female.    Chief Complaint: Tremors (Tremors are okay. States started during [employer] boxing at [facility]. It has helped with her slowness of movement. Mouth tremors are the same. Finds handwriting is a little smaller. ), Abnormal dreams (Since she started taking Aricept in the morning, dreams has decrease.), and Cognitive impairment (States memory is about the same. Denies any agitation. States sleeping well at night.)      History of Present Illness:  Follow-up visit for Parkinson's.  Patient reports she is overall stable since last visit.  She still feels her motor symptoms are well controlled with Sinemet 1.5 tabs t.i.d..  She tries to take her doses at 7:00 a.m., 11:00 a.m., and 3:00 p.m..  Her cardiologist referred her to [facility] and she feels like she is greatly benefitted from that.  She thinks it has specifically helped with her balance.  She does report some difficulty with word fluency.  She says she did talk to the speech therapist at [facility], but she did not think that she required any speech therapy at this time.  They will continue to monitor her.  Recall, she was having issues with vivid dreaming.  She was taking Aricept 10 mg at bedtime so she switched her dose to the morning time in that has resolved the vivid dreaming.  She is still on Adderall which is thought to be a contributor to her tremor in the past.      Review of Systems  I have reviewed a 12 point review of systems which is negative unless otherwise stated in HPI        Past Medical History:   Diagnosis Date    Alzheimer's disease, unspecified (CODE)     Anxiety disorder, unspecified     Bipolar disorder, unspecified     CAD (coronary artery disease)     DDD (degenerative disc disease), cervical     Depression     GERD (gastroesophageal reflux disease)     Parkinson's disease     Rheumatoid arthritis, unspecified     Vitamin D deficiency        Past Surgical  "History:   Procedure Laterality Date    Left ankle surgery Right     Left wrist surgery Left         Family History   Problem Relation Age of Onset    Alzheimer's disease Mother     Heart disease Father         Social History     Socioeconomic History    Marital status:    Tobacco Use    Smoking status: Never     Passive exposure: Never    Smokeless tobacco: Never   Substance and Sexual Activity    Alcohol use: Not Currently    Drug use: Never    Sexual activity: Not Currently        Outpatient Encounter Medications as of 3/16/2023   Medication Sig Dispense Refill    ALPRAZolam (XANAX) 1 MG tablet Take 1 mg by mouth nightly as needed.      carbidopa-levodopa  mg (SINEMET)  mg per tablet TAKE 1 AND 1/2 TABLETS BY MOUTH 3 TIMES A DAY  Strength:  mg 135 tablet 4    celecoxib (CELEBREX) 100 MG capsule Take 100 mg by mouth 2 (two) times daily as needed.      cyclobenzaprine (FLEXERIL) 5 MG tablet Take 1 tablet (5 mg total) by mouth nightly as needed for Muscle spasms. 10 tablet 0    dextroamphetamine-amphetamine (ADDERALL) 20 mg tablet Take 1 tablet by mouth 3 (three) times daily.      donepeziL (ARICEPT) 10 MG tablet Take 1 tablet by mouth nightly.      furosemide (LASIX) 20 MG tablet Take 10-20 mg by mouth daily as needed.      MAGNESIUM ORAL Take 1 tablet by mouth Daily.      melatonin 10 mg Tab Take 1 tablet by mouth as needed.      memantine (NAMENDA) 10 MG Tab Take 1 tablet by mouth 2 (two) times daily.      REXULTI 2 mg Tab Take 1 tablet by mouth every evening.      [DISCONTINUED] carbidopa-levodopa  mg (SINEMET)  mg per tablet TAKE 1 AND 1/2 TABLETS BY MOUTH 3 TIMES A  tablet 1     No facility-administered encounter medications on file as of 3/16/2023.      Objective:   /74 (BP Location: Left arm)   Pulse 72   Ht 5' 6" (1.676 m)   Wt 69.9 kg (154 lb)   BMI 24.86 kg/m²        Physical Exam  Last c/l dose about 1.5 hours prior to exam    General:  Alert and " "oriented  NAD  No overt edema    mild high frequency jaw tremor, worse with distraction during SONYA but also noted infrequently at rest    Cognition and Comprehension:  Speech and language intact  Follows commands    Cranial nerves:   CN 2_ Visual fields (full to confrontation both eyes)  CN 3, 4, 6_ Intact, DANA, no nystagmus  CN 5_facial tactile sensation intact  CN 7_no face asymmetry; normal eye closure and smile  CN 8_hearing intact to spoken voice  CN 9, 10, 11_voice normal, shoulder shrug ok; deltoids not fatigable   CN 12 tongue_protrudes mid line    Muscle Strength and Tone:  Normal upper extremity tone  Normal lower extremity tone  Normal upper extremity strength  Normal lower extremity strength  No bradykinesia noted in RUE, very mild bradykinesia in LUE  No rest tremor in hands noted today  BUE symmetric high frequency tremor with action  No rigidity BUE    Coordination and Gait:  Coordination and gait are normal       Assessment & Plan:      1. Parkinson's disease  Overview:  Initially evaluated September 2020 for memory loss and tremors.  Memory loss had started about 2 years prior and she would already been started on Namenda and Aricept by her PCP.  The tremor she was noticing was noted to be in her jaw and neck.  It started earlier in the year of 2020.  She was noted to be on several psychoactive medications including Luvox, Abilify, and topiramate.  Initial evaluation was consistent with tardive dyskinesia and drug-induced parkinsonism.  She was started on trial of Ingrezza, but did not see any improvement in her symptoms.  A DaTSCAN was ultimately performed and was positive.    Assessment & Plan:  -Patient was "on" during visit today and does seems to be working well.  Continue sinemet 1.5 tabs TID.  Still dyskinetic movements suspicious for TD, but patient did not respond to Ingrezza  continue rise pt      2. Cognitive impairment  Overview:  b12 was 919. 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  Also still on namenda            This note was created with the assistance of voice recognition software. There may be transcription errors as a result of using this technology however minimal. Effort has been made to assure accuracy of transcription but any obvious errors or omissions should be clarified with the author of the document.

## 2023-03-16 NOTE — ASSESSMENT & PLAN NOTE
"-Patient was "on" during visit today and does seems to be working well.  Continue sinemet 1.5 tabs TID.  Still dyskinetic movements suspicious for TD, but patient did not respond to Ingrezza  continue rise pt  "

## 2023-03-23 ENCOUNTER — LAB VISIT (OUTPATIENT)
Dept: LAB | Facility: HOSPITAL | Age: 68
End: 2023-03-23
Attending: STUDENT IN AN ORGANIZED HEALTH CARE EDUCATION/TRAINING PROGRAM
Payer: MEDICARE

## 2023-03-23 DIAGNOSIS — R73.03 PREDIABETES: ICD-10-CM

## 2023-03-23 DIAGNOSIS — F32.A DEPRESSIVE DISORDER: ICD-10-CM

## 2023-03-23 DIAGNOSIS — I25.10 ARTERIOSCLEROSIS OF CORONARY ARTERY: Chronic | ICD-10-CM

## 2023-03-23 LAB
ALBUMIN SERPL-MCNC: 4.3 G/DL (ref 3.4–4.8)
ALBUMIN/GLOB SERPL: 1.5 RATIO (ref 1.1–2)
ALP SERPL-CCNC: 58 UNIT/L (ref 40–150)
ALT SERPL-CCNC: 5 UNIT/L (ref 0–55)
APPEARANCE UR: CLEAR
AST SERPL-CCNC: 24 UNIT/L (ref 5–34)
BACTERIA #/AREA URNS AUTO: NORMAL /HPF
BASOPHILS # BLD AUTO: 0.03 X10(3)/MCL (ref 0–0.2)
BASOPHILS NFR BLD AUTO: 0.5 %
BILIRUB UR QL STRIP.AUTO: NEGATIVE MG/DL
BILIRUBIN DIRECT+TOT PNL SERPL-MCNC: 0.5 MG/DL
BUN SERPL-MCNC: 18.2 MG/DL (ref 9.8–20.1)
CALCIUM SERPL-MCNC: 10.3 MG/DL (ref 8.4–10.2)
CHLORIDE SERPL-SCNC: 105 MMOL/L (ref 98–107)
CHOLEST SERPL-MCNC: 167 MG/DL
CHOLEST/HDLC SERPL: 2 {RATIO} (ref 0–5)
CO2 SERPL-SCNC: 29 MMOL/L (ref 23–31)
COLOR UR AUTO: YELLOW
CREAT SERPL-MCNC: 0.96 MG/DL (ref 0.55–1.02)
EOSINOPHIL # BLD AUTO: 0.13 X10(3)/MCL (ref 0–0.9)
EOSINOPHIL NFR BLD AUTO: 2.3 %
ERYTHROCYTE [DISTWIDTH] IN BLOOD BY AUTOMATED COUNT: 13.2 % (ref 11.5–17)
GFR SERPLBLD CREATININE-BSD FMLA CKD-EPI: >60 MLS/MIN/1.73/M2
GLOBULIN SER-MCNC: 2.8 GM/DL (ref 2.4–3.5)
GLUCOSE SERPL-MCNC: 94 MG/DL (ref 82–115)
GLUCOSE UR QL STRIP.AUTO: NEGATIVE MG/DL
HCT VFR BLD AUTO: 39.8 % (ref 37–47)
HDLC SERPL-MCNC: 92 MG/DL (ref 35–60)
HGB BLD-MCNC: 13.4 G/DL (ref 12–16)
IMM GRANULOCYTES # BLD AUTO: 0.01 X10(3)/MCL (ref 0–0.04)
IMM GRANULOCYTES NFR BLD AUTO: 0.2 %
KETONES UR QL STRIP.AUTO: NEGATIVE MG/DL
LDLC SERPL CALC-MCNC: 62 MG/DL (ref 50–140)
LEUKOCYTE ESTERASE UR QL STRIP.AUTO: ABNORMAL UNIT/L
LYMPHOCYTES # BLD AUTO: 1.83 X10(3)/MCL (ref 0.6–4.6)
LYMPHOCYTES NFR BLD AUTO: 31.9 %
MCH RBC QN AUTO: 30.2 PG
MCHC RBC AUTO-ENTMCNC: 33.7 G/DL (ref 33–36)
MCV RBC AUTO: 89.8 FL (ref 80–94)
MONOCYTES # BLD AUTO: 0.57 X10(3)/MCL (ref 0.1–1.3)
MONOCYTES NFR BLD AUTO: 9.9 %
NEUTROPHILS # BLD AUTO: 3.16 X10(3)/MCL (ref 2.1–9.2)
NEUTROPHILS NFR BLD AUTO: 55.2 %
NITRITE UR QL STRIP.AUTO: NEGATIVE
NRBC BLD AUTO-RTO: 0 %
PH UR STRIP.AUTO: 6 [PH]
PLATELET # BLD AUTO: 237 X10(3)/MCL (ref 130–400)
PMV BLD AUTO: 10 FL (ref 7.4–10.4)
POTASSIUM SERPL-SCNC: 3.7 MMOL/L (ref 3.5–5.1)
PROT SERPL-MCNC: 7.1 GM/DL (ref 5.8–7.6)
PROT UR QL STRIP.AUTO: NEGATIVE MG/DL
RBC # BLD AUTO: 4.43 X10(6)/MCL (ref 4.2–5.4)
RBC #/AREA URNS AUTO: NORMAL /HPF
RBC UR QL AUTO: NEGATIVE UNIT/L
SODIUM SERPL-SCNC: 144 MMOL/L (ref 136–145)
SP GR UR STRIP.AUTO: 1.01 (ref 1–1.03)
SQUAMOUS #/AREA URNS AUTO: NORMAL /HPF
TRIGL SERPL-MCNC: 66 MG/DL (ref 37–140)
TSH SERPL-ACNC: 1.17 UIU/ML (ref 0.35–4.94)
UROBILINOGEN UR STRIP-ACNC: 0.2 MG/DL
VLDLC SERPL CALC-MCNC: 13 MG/DL
WBC # SPEC AUTO: 5.7 X10(3)/MCL (ref 4.5–11.5)
WBC #/AREA URNS AUTO: NORMAL /HPF

## 2023-03-23 PROCEDURE — 80061 LIPID PANEL: CPT

## 2023-03-23 PROCEDURE — 80053 COMPREHEN METABOLIC PANEL: CPT

## 2023-03-23 PROCEDURE — 83036 HEMOGLOBIN GLYCOSYLATED A1C: CPT

## 2023-03-23 PROCEDURE — 36415 COLL VENOUS BLD VENIPUNCTURE: CPT

## 2023-03-23 PROCEDURE — 85025 COMPLETE CBC W/AUTO DIFF WBC: CPT

## 2023-03-23 PROCEDURE — 84443 ASSAY THYROID STIM HORMONE: CPT

## 2023-03-24 LAB
EST. AVERAGE GLUCOSE BLD GHB EST-MCNC: 105.4 MG/DL
HBA1C MFR BLD: 5.3 %

## 2023-03-28 DIAGNOSIS — E83.52 HYPERCALCEMIA: Primary | ICD-10-CM

## 2023-03-29 ENCOUNTER — TELEPHONE (OUTPATIENT)
Dept: FAMILY MEDICINE | Facility: CLINIC | Age: 68
End: 2023-03-29
Payer: MEDICARE

## 2023-03-29 NOTE — PROGRESS NOTES
Please inform patient of lab results.      Please inform the following regarding Blood work regarding Hypercalcemia ( increased Calcium level in Blood)     1) Stop taking OTC Vit D and Calcium, Hydrochlorothiazide, Biotin  supplementations if taking x 1 week  2) Recommend to Increase Hydration  3)Repeat blood work and Calcium lab orders are in computer. Please take the blood work after 1 week from today.    Other labwork within acceptable ranges.    Thanks,    Dr. Guardado

## 2023-03-29 NOTE — TELEPHONE ENCOUNTER
----- Message from Vanessa Guardado MD sent at 3/28/2023  7:49 PM CDT -----  Please inform patient of lab results.      Please inform the following regarding Blood work regarding Hypercalcemia ( increased Calcium level in Blood)     1) Stop taking OTC Vit D and Calcium, Hydrochlorothiazide, Biotin  supplementations if taking x 1 week  2) Recommend to Increase Hydration  3)Repeat blood work and Calcium lab orders are in computer. Please take the blood work after 1 week from today.    Other labwork within acceptable ranges.    Thanks,    Dr. Guardado

## 2023-04-05 ENCOUNTER — LAB VISIT (OUTPATIENT)
Dept: LAB | Facility: HOSPITAL | Age: 68
End: 2023-04-05
Attending: STUDENT IN AN ORGANIZED HEALTH CARE EDUCATION/TRAINING PROGRAM
Payer: MEDICARE

## 2023-04-05 DIAGNOSIS — E83.52 HYPERCALCEMIA: ICD-10-CM

## 2023-04-05 DIAGNOSIS — N17.9 AKI (ACUTE KIDNEY INJURY): Primary | ICD-10-CM

## 2023-04-05 LAB
ALBUMIN SERPL-MCNC: 4.3 G/DL (ref 3.4–4.8)
ALBUMIN/GLOB SERPL: 1.5 RATIO (ref 1.1–2)
ALP SERPL-CCNC: 65 UNIT/L (ref 40–150)
ALT SERPL-CCNC: 25 UNIT/L (ref 0–55)
AST SERPL-CCNC: 27 UNIT/L (ref 5–34)
BILIRUBIN DIRECT+TOT PNL SERPL-MCNC: 0.5 MG/DL
BUN SERPL-MCNC: 14.5 MG/DL (ref 9.8–20.1)
CALCIUM SERPL-MCNC: 10 MG/DL (ref 8.4–10.2)
CHLORIDE SERPL-SCNC: 106 MMOL/L (ref 98–107)
CO2 SERPL-SCNC: 30 MMOL/L (ref 23–31)
CREAT SERPL-MCNC: 1.05 MG/DL (ref 0.55–1.02)
DEPRECATED CALCIDIOL+CALCIFEROL SERPL-MC: 56.8 NG/ML (ref 30–80)
GFR SERPLBLD CREATININE-BSD FMLA CKD-EPI: 58 MLS/MIN/1.73/M2
GLOBULIN SER-MCNC: 2.9 GM/DL (ref 2.4–3.5)
GLUCOSE SERPL-MCNC: 92 MG/DL (ref 82–115)
POTASSIUM SERPL-SCNC: 4.2 MMOL/L (ref 3.5–5.1)
PROT SERPL-MCNC: 7.2 GM/DL (ref 5.8–7.6)
PTH-INTACT SERPL-MCNC: 73.2 PG/ML (ref 8.7–77)
SODIUM SERPL-SCNC: 142 MMOL/L (ref 136–145)

## 2023-04-05 PROCEDURE — 82330 ASSAY OF CALCIUM: CPT | Mod: 90

## 2023-04-05 PROCEDURE — 82306 VITAMIN D 25 HYDROXY: CPT

## 2023-04-05 PROCEDURE — 83970 ASSAY OF PARATHORMONE: CPT

## 2023-04-05 PROCEDURE — 36415 COLL VENOUS BLD VENIPUNCTURE: CPT

## 2023-04-05 PROCEDURE — 80053 COMPREHEN METABOLIC PANEL: CPT

## 2023-04-05 NOTE — PROGRESS NOTES
Please inform patient of lab results.    1. Brandan ( acute kidney injury close window  BRANDAN recs  Increase Hydration  Avoid Nephrotoxic medications like NSAIDS/ Celebrex  Please confirm if the patient took new antibiotics or recreational drugs ,dehydration, diarrhea, vomiting, acute illness, hospitalization, recent angiograms or exposure to IV radiocontrast.  Hold Celebrex as of now until the renal function are back to baseline  Monitor BP and CBG closely   Please get checked in 3 days ( after 4/7/23) of increasing hydration and  holding ACEI/  NSAIDS      Thanks,    Dr. Guardado

## 2023-04-06 ENCOUNTER — TELEPHONE (OUTPATIENT)
Dept: FAMILY MEDICINE | Facility: CLINIC | Age: 68
End: 2023-04-06
Payer: MEDICARE

## 2023-04-06 LAB — CA-I ADJ PH7.4 SERPL ISE-MCNC: 5.3 MG/DL (ref 4.57–5.43)

## 2023-04-06 NOTE — TELEPHONE ENCOUNTER
----- Message from Vanessa Gaurdado MD sent at 4/5/2023  1:15 PM CDT -----  Please inform patient of lab results.    1. Brandan ( acute kidney injury close window  BRANDAN recs  Increase Hydration  Avoid Nephrotoxic medications like NSAIDS/ Celebrex  Please confirm if the patient took new antibiotics or recreational drugs ,dehydration, diarrhea, vomiting, acute illness, hospitalization, recent angiograms or exposure to IV radiocontrast.  Hold Celebrex as of now until the renal function are back to baseline  Monitor BP and CBG closely   Please get checked in 3 days ( after 4/7/23) of increasing hydration and  holding ACEI/  NSAIDS      Thanks,    Dr. Guardado

## 2023-04-10 ENCOUNTER — TELEPHONE (OUTPATIENT)
Dept: FAMILY MEDICINE | Facility: CLINIC | Age: 68
End: 2023-04-10
Payer: MEDICARE

## 2023-04-10 NOTE — TELEPHONE ENCOUNTER
Attempted to call patient about results.    NA Detailed message left explaining result to patient. Encouraged patient to call back within th message if she had any questions, comments, or concerns.

## 2023-04-11 ENCOUNTER — TELEPHONE (OUTPATIENT)
Dept: FAMILY MEDICINE | Facility: CLINIC | Age: 68
End: 2023-04-11
Payer: MEDICARE

## 2023-04-11 DIAGNOSIS — M62.89 MUSCLE STIFFNESS: Primary | ICD-10-CM

## 2023-04-11 DIAGNOSIS — M62.89 MUSCLE STIFFNESS: ICD-10-CM

## 2023-04-11 RX ORDER — FUROSEMIDE 20 MG/1
10-20 TABLET ORAL DAILY PRN
Qty: 30 TABLET | Refills: 3 | Status: SHIPPED | OUTPATIENT
Start: 2023-04-11 | End: 2023-05-11 | Stop reason: SDUPTHER

## 2023-04-11 RX ORDER — CYCLOBENZAPRINE HCL 10 MG
5 TABLET ORAL 3 TIMES DAILY PRN
Qty: 30 TABLET | Refills: 0 | Status: SHIPPED | OUTPATIENT
Start: 2023-04-11 | End: 2023-04-13 | Stop reason: SDUPTHER

## 2023-04-13 DIAGNOSIS — M62.89 MUSCLE STIFFNESS: ICD-10-CM

## 2023-04-13 RX ORDER — CYCLOBENZAPRINE HCL 10 MG
5 TABLET ORAL 3 TIMES DAILY PRN
Qty: 30 TABLET | Refills: 0 | Status: SHIPPED | OUTPATIENT
Start: 2023-04-13 | End: 2023-04-23

## 2023-05-11 RX ORDER — CYCLOBENZAPRINE HCL 10 MG
10 TABLET ORAL 3 TIMES DAILY PRN
Qty: 30 TABLET | Refills: 3 | Status: SHIPPED | OUTPATIENT
Start: 2023-05-11 | End: 2023-10-23 | Stop reason: SDUPTHER

## 2023-05-11 RX ORDER — CYCLOBENZAPRINE HCL 10 MG
10 TABLET ORAL 3 TIMES DAILY PRN
COMMUNITY
Start: 2023-04-29 | End: 2023-05-11 | Stop reason: SDUPTHER

## 2023-05-11 RX ORDER — FUROSEMIDE 20 MG/1
10-20 TABLET ORAL DAILY PRN
Qty: 30 TABLET | Refills: 3 | Status: SHIPPED | OUTPATIENT
Start: 2023-05-11 | End: 2023-10-23 | Stop reason: SDUPTHER

## 2023-07-10 ENCOUNTER — TELEPHONE (OUTPATIENT)
Dept: FAMILY MEDICINE | Facility: CLINIC | Age: 68
End: 2023-07-10
Payer: MEDICARE

## 2023-07-10 NOTE — TELEPHONE ENCOUNTER
----- Message from Marichuy Valdez sent at 7/10/2023  3:17 PM CDT -----  .Type:  Needs Medical Advice    Who Called: pt    Would the patient rather a call back or a response via [facility]? Marcel back   Best Call Back Number: [phone]  Additional Information: needs a referral to [facility]  breast center appointment is 10/05/2023   breast center appointment is 10/05/2023

## 2023-07-11 ENCOUNTER — TELEPHONE (OUTPATIENT)
Dept: FAMILY MEDICINE | Facility: CLINIC | Age: 68
End: 2023-07-11
Payer: MEDICARE

## 2023-07-11 DIAGNOSIS — Z12.39 ENCOUNTER FOR BREAST CANCER SCREENING USING NON-MAMMOGRAM MODALITY: Primary | ICD-10-CM

## 2023-07-11 NOTE — TELEPHONE ENCOUNTER
----- Message from Dodie Zarate sent at 7/11/2023  1:48 PM CDT -----  Regarding: Request  .Caller is requesting to schedule their Lab appointment prior to annual appointment.  Order is not listed in EPIC.  Please enter order and contact patient to schedule.    Name of Caller:Barb    Where would they like the lab performed?Breast center [facility]    Would the patient rather a call back or a response via [facility]?     Best Call Back Number:[phone] fax [phone]    Additional Information:Need orders for breast ultrasound sent to [phone]

## 2023-07-31 DIAGNOSIS — G20.A1 PARKINSON'S DISEASE: ICD-10-CM

## 2023-07-31 RX ORDER — CARBIDOPA AND LEVODOPA 25; 100 MG/1; MG/1
TABLET ORAL
Qty: 405 TABLET | Refills: 1 | Status: SHIPPED | OUTPATIENT
Start: 2023-07-31 | End: 2023-11-14 | Stop reason: SDUPTHER

## 2023-08-14 ENCOUNTER — DOCUMENTATION ONLY (OUTPATIENT)
Dept: FAMILY MEDICINE | Facility: CLINIC | Age: 68
End: 2023-08-14
Payer: MEDICARE

## 2023-09-15 ENCOUNTER — OFFICE VISIT (OUTPATIENT)
Dept: FAMILY MEDICINE | Facility: CLINIC | Age: 68
End: 2023-09-15
Payer: MEDICARE

## 2023-09-15 VITALS
SYSTOLIC BLOOD PRESSURE: 112 MMHG | HEIGHT: 66 IN | RESPIRATION RATE: 17 BRPM | HEART RATE: 75 BPM | BODY MASS INDEX: 25.55 KG/M2 | DIASTOLIC BLOOD PRESSURE: 72 MMHG | TEMPERATURE: 98 F | WEIGHT: 159 LBS | OXYGEN SATURATION: 97 %

## 2023-09-15 DIAGNOSIS — F02.80 ALZHEIMER'S DISEASE: ICD-10-CM

## 2023-09-15 DIAGNOSIS — F32.A DEPRESSIVE DISORDER: ICD-10-CM

## 2023-09-15 DIAGNOSIS — I25.10 CORONARY ARTERY DISEASE INVOLVING NATIVE HEART WITHOUT ANGINA PECTORIS, UNSPECIFIED VESSEL OR LESION TYPE: ICD-10-CM

## 2023-09-15 DIAGNOSIS — Z12.11 ENCOUNTER FOR SCREENING FOR MALIGNANT NEOPLASM OF COLON: ICD-10-CM

## 2023-09-15 DIAGNOSIS — G20.A1 PARKINSON'S DISEASE: ICD-10-CM

## 2023-09-15 DIAGNOSIS — E78.2 MIXED HYPERLIPIDEMIA: ICD-10-CM

## 2023-09-15 DIAGNOSIS — F31.9 BIPOLAR I DISORDER: ICD-10-CM

## 2023-09-15 DIAGNOSIS — F41.1 GENERALIZED ANXIETY DISORDER: ICD-10-CM

## 2023-09-15 DIAGNOSIS — G30.9 ALZHEIMER'S DISEASE: ICD-10-CM

## 2023-09-15 DIAGNOSIS — R79.9 ABNORMAL FINDING OF BLOOD CHEMISTRY, UNSPECIFIED: ICD-10-CM

## 2023-09-15 PROCEDURE — 99213 PR OFFICE/OUTPT VISIT, EST, LEVL III, 20-29 MIN: ICD-10-PCS | Mod: ,,, | Performed by: STUDENT IN AN ORGANIZED HEALTH CARE EDUCATION/TRAINING PROGRAM

## 2023-09-15 PROCEDURE — 99213 OFFICE O/P EST LOW 20 MIN: CPT | Mod: ,,, | Performed by: STUDENT IN AN ORGANIZED HEALTH CARE EDUCATION/TRAINING PROGRAM

## 2023-09-15 RX ORDER — ATORVASTATIN CALCIUM 10 MG/1
10 TABLET, FILM COATED ORAL DAILY
Qty: 90 TABLET | Refills: 3 | Status: SHIPPED | OUTPATIENT
Start: 2023-09-15 | End: 2024-09-14

## 2023-09-15 NOTE — PROGRESS NOTES
Patient ID: [id number]     Chief Complaint: Depression (6 MTH f/u)        HPI:      Disclaimer:  This note is prepared using voice recognition software and as such is likely to have errors despite attempts at proofreading. Please contact me for questions.     Beverly Shah is a 67 y.o. female here today for the following    Acute Issues-  Alzheimer's disease  Was diagnosed almost 5 years ago   Memory remains the same  Sees Dr. Diana Robbins    Currently on memantine 10 mg once daily and Aricept 10 mg once nightly  Stable    Parkinson's  Patient is currently on levodopa carbidopa and is stable    Anxiety disorder, unspecified  Bipolar disorder, unspecified  Depression  Currently on Xanax 1 mg p.r.n. nightly, Rexulti 2 mg nightly  Overall feels great denies of any SI/HI  Well controlled    CAD (coronary artery disease)  Has a history of mitral valve prolapse   Following Dr. Matt Olson   Currently not on any statins.    GERD (gastroesophageal reflux disease)  Stable  Not on protonix    Rheumatoid arthritis, unspecified  DDD (degenerative disc disease), cervical  Following Dr. Miranda    Vitamin D deficiency   Taking over-the-counter vitamin-D supplementations 300 IU every day.    ADHD  On Adderall   Seeing Dr. Mayuri Spears   Chronic Issues-    ----------------------------  Alzheimer's disease, unspecified (CODE)  Anxiety disorder, unspecified  Bipolar disorder, unspecified  CAD (coronary artery disease)  DDD (degenerative disc disease), cervical  Depression  GERD (gastroesophageal reflux disease)  Parkinson's disease  Rheumatoid arthritis, unspecified  Vitamin D deficiency     Past Surgical History:   Procedure Laterality Date    Left ankle surgery Right     Left wrist surgery Left        Review of patient's allergies indicates:  No Known Allergies    Current Outpatient Medications   Medication Instructions    ALPRAZolam (XANAX) 1 mg, Oral, Nightly PRN    carbidopa-levodopa  mg (SINEMET)  mg per  tablet TAKE 1 AND 1/2 TABLETS BY MOUTH 3 TIMES A DAY    celecoxib (CELEBREX) 100 mg, Oral, 2 times daily PRN    cyclobenzaprine (FLEXERIL) 10 mg, Oral, 3 times daily PRN    dextroamphetamine-amphetamine (ADDERALL) 20 mg tablet 1 tablet, Oral, 3 times daily    donepeziL (ARICEPT) 10 MG tablet 1 tablet, Oral, Nightly    furosemide (LASIX) 10-20 mg, Oral, Daily PRN    MAGNESIUM ORAL 1 tablet, Oral, Daily    melatonin 10 mg Tab 1 tablet, Oral, As needed (PRN)    memantine (NAMENDA) 10 MG Tab 1 tablet, Oral, 2 times daily    REXULTI 2 mg Tab 1 tablet, Oral, Nightly       Social History     Socioeconomic History    Marital status:    Tobacco Use    Smoking status: Never     Passive exposure: Never    Smokeless tobacco: Never   Substance and Sexual Activity    Alcohol use: Not Currently    Drug use: Never    Sexual activity: Not Currently        Family History   Problem Relation Age of Onset    Alzheimer's disease Mother     Heart disease Father         Patient Care Team:  Vanessa Guardado MD as PCP - General (Family Medicine)  Vanessa Guardado MD as PCP - Family Medicine (Family Medicine)     Subjective:     ROS    See HPI for details    Constitutional: Denies Change in appetite. Denies Chills. Denies Fever. Denies Night sweats.  Respiratory: Denies Cough. Denies Shortness of breath. Denies Shortness of breath with exertion. Denies Wheezing.  Cardiovascular: DeniesChest pain at rest. Denies Chest pain with exertion. Denies Irregular heartbeat. Denies Palpitations. Denies Edema.  Gastrointestinal: Denies Abdominal pain. DeniesDiarrhea. Denies Nausea. Denies Vomiting. Denies Hematemesis or Hematochezia.  Genitourinary: Denies Dysuria. Denies Urinary frequency. Denies Urinary urgency. Denies Blood in urine.  Endocrine: Denies Cold intolerance. Denies Excessive thirst. Denies Heat intolerance. Denies Weight loss. Denies Weight gain.  Musculoskeletal: Denies Painful joints. Denies Weakness.  Integumentary: Denies  "Rash. Denies Itching. Denies Dry skin.  Neurologic: Denies Dizziness. Denies Fainting. Denies Headache.  Psychiatric: Denies Depression. Denies Anxiety. Denies Suicidal/Homicidal ideations.    All Other ROS: Negative except as stated in HPI.  Objective:     Visit Vitals  /72 (BP Location: Right arm, Patient Position: Sitting, BP Method: Large (Manual))   Pulse 75   Temp 98.3 °F (36.8 °C) (Oral)   Resp 17   Ht 5' 6" (1.676 m)   Wt 72.1 kg (159 lb)   SpO2 97%   BMI 25.66 kg/m²       Physical Exam    General: Alert and oriented, No acute distress.  Respiratory: Clear to auscultation bilaterally; No wheezes, rales or rhonchi,Non-labored respirations, Symmetrical chest wall expansion.  Cardiovascular: Regular rate and rhythm, S1/S2 normal, No murmurs, rubs or gallops.  Gastrointestinal: Soft, Non-tender, Non-distended, Normal bowel sounds, No palpable organomegaly.  Musculoskeletal: Normal range of motion.  Extremities: No clubbing, cyanosis or edema  Neurologic: No focal deficits  Psychiatric: Normal interaction, Coherent speech, Euthymic mood, Appropriate affect   Assessment:       ICD-10-CM ICD-9-CM   1. Bipolar I disorder  F31.9 296.7   2. Alzheimer's disease  G30.9 331.0    F02.80    3. Encounter for screening for malignant neoplasm of colon  Z12.11 V76.51   4. Encounter for lipid screening for cardiovascular disease  Z13.220 V77.91    Z13.6 V81.2   5. Abnormal finding of blood chemistry, unspecified  R79.9 790.6   6. Mixed hyperlipidemia  E78.2 272.2   7. Parkinson's disease  G20 332.0   8. Generalized anxiety disorder  F41.1 300.02   9. Depressive disorder  F32.A 311   10. Coronary artery disease involving native heart without angina pectoris, unspecified vessel or lesion type  I25.10 414.01       Plan:     1. Bipolar I disorder  4. Generalized anxiety disorder  5. Depressive disorder  Continue  on Xanax 1 mg p.r.n. nightly, Rexulti 2 mg nightly    2. Alzheimer's disease  -     CBC Auto Differential; Future; " Expected date: 03/15/2024  -     Comprehensive Metabolic Panel; Future; Expected date: 03/15/2024  -     Lipid Panel; Future; Expected date: 03/15/2024  -     TSH; Future; Expected date: 03/15/2024  -     Hemoglobin A1C; Future; Expected date: 03/15/2024  -     Urinalysis; Future; Expected date: 03/15/2024  Keep Scheduled visit with Primary  Continue memantine and Aricept as prescribed    3. Parkinson's disease  Continue levodopa/carbidopa as prescribed    6. Coronary artery disease involving native heart without angina pectoris, unspecified vessel or lesion type  Keep scheduled follow up with Dr. Matt Olson  Keep goal LDL less than 70, goal blood pressure less than 130/80, goal A1c less than 7   Continue 35 minutes of brisk walking with Mediterranean diet    7. Encounter for screening for malignant neoplasm of colon  -     Cologuard Screening (Multitarget Stool DNA); Future; Expected date: 03/26/2024    8. Abnormal finding of blood chemistry, unspecified  -     Hemoglobin A1C; Future; Expected date: 03/15/2024    9. Mixed hyperlipidemia  -     Lipid Panel; Future; Expected date: 03/15/2024  -     atorvastatin (LIPITOR) 10 MG tablet; Take 1 tablet (10 mg total) by mouth once daily.  Dispense: 90 tablet; Refill: 3    Medication List with Changes/Refills   Current Medications    ALPRAZOLAM (XANAX) 1 MG TABLET    Take 1 mg by mouth nightly as needed.       Start Date: 9/1/2022  End Date: --    CARBIDOPA-LEVODOPA  MG (SINEMET)  MG PER TABLET    TAKE 1 AND 1/2 TABLETS BY MOUTH 3 TIMES A DAY       Start Date: 7/31/2023 End Date: --    CELECOXIB (CELEBREX) 100 MG CAPSULE    Take 100 mg by mouth 2 (two) times daily as needed.       Start Date: 8/20/2022 End Date: --    CYCLOBENZAPRINE (FLEXERIL) 10 MG TABLET    Take 1 tablet (10 mg total) by mouth 3 (three) times daily as needed for Muscle spasms.       Start Date: 5/11/2023 End Date: --    DEXTROAMPHETAMINE-AMPHETAMINE (ADDERALL) 20 MG TABLET    Take 1 tablet  by mouth 3 (three) times daily.       Start Date: 9/1/2022  End Date: --    DONEPEZIL (ARICEPT) 10 MG TABLET    Take 1 tablet by mouth nightly.       Start Date: 11/30/2021End Date: --    FUROSEMIDE (LASIX) 20 MG TABLET    Take 0.5-1 tablets (10-20 mg total) by mouth daily as needed (to prevent swelling).       Start Date: 5/11/2023 End Date: --    MAGNESIUM ORAL    Take 1 tablet by mouth Daily.       Start Date: 11/16/2021End Date: --    MELATONIN 10 MG TAB    Take 1 tablet by mouth as needed.       Start Date: --        End Date: --    MEMANTINE (NAMENDA) 10 MG TAB    Take 1 tablet by mouth 2 (two) times daily.       Start Date: 12/15/2021End Date: --    REXULTI 2 MG TAB    Take 1 tablet by mouth every evening.       Start Date: 8/30/2022 End Date: --          Follow up for Annual Wellness - keep scheduled. .   In addition to their scheduled follow up, the patient has also been instructed to follow up on as needed basis.

## 2023-10-05 LAB — BCS RECOMMENDATION EXT: NORMAL

## 2023-10-09 ENCOUNTER — DOCUMENTATION ONLY (OUTPATIENT)
Dept: FAMILY MEDICINE | Facility: CLINIC | Age: 68
End: 2023-10-09
Payer: MEDICARE

## 2023-10-09 ENCOUNTER — TELEPHONE (OUTPATIENT)
Dept: FAMILY MEDICINE | Facility: CLINIC | Age: 68
End: 2023-10-09
Payer: MEDICARE

## 2023-10-09 NOTE — TELEPHONE ENCOUNTER
----- Message from Vanessa Guardado MD sent at 10/9/2023 10:40 AM CDT -----  Normal MMG. Repeat in 1 year.

## 2023-10-23 RX ORDER — FUROSEMIDE 20 MG/1
10-20 TABLET ORAL DAILY PRN
Qty: 30 TABLET | Refills: 3 | Status: SHIPPED | OUTPATIENT
Start: 2023-10-23 | End: 2023-12-11 | Stop reason: SDUPTHER

## 2023-10-23 RX ORDER — CYCLOBENZAPRINE HCL 10 MG
10 TABLET ORAL 3 TIMES DAILY PRN
Qty: 30 TABLET | Refills: 0 | Status: SHIPPED | OUTPATIENT
Start: 2023-10-23 | End: 2023-11-20 | Stop reason: SDUPTHER

## 2023-11-14 ENCOUNTER — OFFICE VISIT (OUTPATIENT)
Dept: NEUROLOGY | Facility: CLINIC | Age: 68
End: 2023-11-14
Payer: MEDICARE

## 2023-11-14 VITALS
WEIGHT: 160 LBS | BODY MASS INDEX: 25.71 KG/M2 | SYSTOLIC BLOOD PRESSURE: 136 MMHG | DIASTOLIC BLOOD PRESSURE: 78 MMHG | HEIGHT: 66 IN

## 2023-11-14 DIAGNOSIS — G20.A1 PARKINSON'S DISEASE WITHOUT DYSKINESIA OR FLUCTUATING MANIFESTATIONS: Primary | Chronic | ICD-10-CM

## 2023-11-14 DIAGNOSIS — G20.A1 PARKINSON'S DISEASE: ICD-10-CM

## 2023-11-14 PROCEDURE — 99213 PR OFFICE/OUTPT VISIT, EST, LEVL III, 20-29 MIN: ICD-10-PCS | Mod: S$PBB,,, | Performed by: PSYCHIATRY & NEUROLOGY

## 2023-11-14 PROCEDURE — 99999 PR PBB SHADOW E&M-EST. PATIENT-LVL III: CPT | Mod: PBBFAC,,, | Performed by: PSYCHIATRY & NEUROLOGY

## 2023-11-14 PROCEDURE — 99213 OFFICE O/P EST LOW 20 MIN: CPT | Mod: PBBFAC | Performed by: PSYCHIATRY & NEUROLOGY

## 2023-11-14 PROCEDURE — 99999 PR PBB SHADOW E&M-EST. PATIENT-LVL III: ICD-10-PCS | Mod: PBBFAC,,, | Performed by: PSYCHIATRY & NEUROLOGY

## 2023-11-14 PROCEDURE — 99213 OFFICE O/P EST LOW 20 MIN: CPT | Mod: S$PBB,,, | Performed by: PSYCHIATRY & NEUROLOGY

## 2023-11-14 RX ORDER — CARBIDOPA AND LEVODOPA 25; 100 MG/1; MG/1
TABLET ORAL
Qty: 405 TABLET | Refills: 2 | Status: SHIPPED | OUTPATIENT
Start: 2023-11-14

## 2023-11-14 NOTE — PROGRESS NOTES
Subjective:       Patient ID: Beverly Shah is a 67 y.o. female.    Chief Complaint: Parkinson's disease (6 month follow up for Parkinson's disease. Patient states no change in tremors. Doing well on sinemet. Stopped going to physical therapy about a month ago. No change in memory. No other neurological complaints.)      History of Present Illness:  Follow-up visit for Parkinson's.  Patient reports she is overall stable since last visit.  She still feels her motor symptoms are well controlled with Sinemet 1.5 tabs t.i.d..  She tries to take her doses at 7:00 a.m., 11:00 a.m., and 3:00 p.m..  Her cardiologist referred her to [facility] and she feels like she is greatly benefitted from that.  She thinks it has specifically helped with her balance.  She does report some difficulty with word fluency.  She says she did talk to the speech therapist at [facility], but she did not think that she required any speech therapy at this time.  They will continue to monitor her.  Recall, she was having issues with vivid dreaming.  She was taking Aricept 10 mg at bedtime so she switched her dose to the morning time in that has resolved the vivid dreaming.  She is still on Adderall which is thought to be a contributor to her tremor in the past.    Works at [employer] no work performance issues.     Review of Systems  I have reviewed a 12 point review of systems which is negative unless otherwise stated in HPI        Past Medical History:   Diagnosis Date    Alzheimer's disease, unspecified (CODE)     Anxiety disorder, unspecified     Bipolar disorder, unspecified     CAD (coronary artery disease)     DDD (degenerative disc disease), cervical     Depression     GERD (gastroesophageal reflux disease)     Parkinson's disease     Rheumatoid arthritis, unspecified     Vitamin D deficiency        Past Surgical History:   Procedure Laterality Date    Left ankle surgery Right     Left wrist surgery Left         Family History   Problem Relation Age  of Onset    Alzheimer's disease Mother     Heart disease Father         Social History     Socioeconomic History    Marital status:    Tobacco Use    Smoking status: Never     Passive exposure: Never    Smokeless tobacco: Never   Substance and Sexual Activity    Alcohol use: Not Currently    Drug use: Never    Sexual activity: Not Currently        Outpatient Encounter Medications as of 11/14/2023   Medication Sig Dispense Refill    ALPRAZolam (XANAX) 1 MG tablet Take 1 mg by mouth nightly as needed.      atorvastatin (LIPITOR) 10 MG tablet Take 1 tablet (10 mg total) by mouth once daily. 90 tablet 3    carbidopa-levodopa  mg (SINEMET)  mg per tablet TAKE 1 AND 1/2 TABLETS BY MOUTH 3 TIMES A  tablet 1    celecoxib (CELEBREX) 100 MG capsule Take 100 mg by mouth 2 (two) times daily as needed.      cyclobenzaprine (FLEXERIL) 10 MG tablet Take 1 tablet (10 mg total) by mouth 3 (three) times daily as needed for Muscle spasms. 30 tablet 0    dextroamphetamine-amphetamine (ADDERALL) 20 mg tablet Take 1 tablet by mouth 3 (three) times daily.      donepeziL (ARICEPT) 10 MG tablet Take 1 tablet by mouth nightly.      furosemide (LASIX) 20 MG tablet Take 0.5-1 tablets (10-20 mg total) by mouth daily as needed (to prevent swelling). 30 tablet 3    MAGNESIUM ORAL Take 1 tablet by mouth Daily.      melatonin 10 mg Tab Take 1 tablet by mouth as needed.      memantine (NAMENDA) 10 MG Tab Take 1 tablet by mouth 2 (two) times daily.      REXULTI 2 mg Tab Take 1 tablet by mouth every evening.      [DISCONTINUED] cyclobenzaprine (FLEXERIL) 10 MG tablet Take 1 tablet (10 mg total) by mouth 3 (three) times daily as needed for Muscle spasms. 30 tablet 3    [DISCONTINUED] furosemide (LASIX) 20 MG tablet Take 0.5-1 tablets (10-20 mg total) by mouth daily as needed (to prevent swelling). 30 tablet 3     No facility-administered encounter medications on file as of 11/14/2023.      Objective:   /78 (BP Location:  "Right arm, Patient Position: Sitting)   Ht 5' 6" (1.676 m)   Wt 72.6 kg (160 lb)   BMI 25.82 kg/m²        Physical Exam  Last c/l dose about 1.5 hours prior to exam    General:  Alert and oriented  NAD  No overt edema    mild high frequency jaw tremor, worse with distraction during SONYA but also noted infrequently at rest    Cognition and Comprehension:  Speech and language intact  Follows commands    Cranial nerves:   CN 2_ Visual fields (full to confrontation both eyes)  CN 3, 4, 6_ Intact, DANA, no nystagmus  CN 5_facial tactile sensation intact  CN 7_no face asymmetry; normal eye closure and smile  CN 8_hearing intact to spoken voice  CN 9, 10, 11_voice normal, shoulder shrug ok; deltoids not fatigable   CN 12 tongue_protrudes mid line    Muscle Strength and Tone:  Normal upper extremity tone  Normal lower extremity tone  Normal upper extremity strength  Normal lower extremity strength  No bradykinesia noted in RUE, very mild bradykinesia in LUE  No rest tremor in hands noted today  BUE symmetric high frequency tremor with action  No rigidity BUE    Coordination and Gait:  Coordination and gait are normal       Assessment & Plan:      1. Parkinson's disease without dyskinesia or fluctuating manifestations  Overview:  Initially evaluated September 2020 for memory loss and tremors.  Memory loss had started about 2 years prior and she would already been started on Namenda and Aricept by her PCP.  The tremor she was noticing was noted to be in her jaw and neck.  It started earlier in the year of 2020.  She was noted to be on several psychoactive medications including Luvox, Abilify, and topiramate.  Initial evaluation was consistent with tardive dyskinesia and drug-induced parkinsonism.  She was started on trial of Ingrezza, but did not see any improvement in her symptoms.  A DaTSCAN was ultimately performed and was positive.        Doing well, cont sinemet      This note was created with the assistance of voice " recognition software. There may be transcription errors as a result of using this technology however minimal. Effort has been made to assure accuracy of transcription but any obvious errors or omissions should be clarified with the author of the document.

## 2023-11-20 RX ORDER — CYCLOBENZAPRINE HCL 10 MG
10 TABLET ORAL 3 TIMES DAILY PRN
Qty: 30 TABLET | Refills: 0 | Status: SHIPPED | OUTPATIENT
Start: 2023-11-20 | End: 2023-12-19 | Stop reason: SDUPTHER

## 2023-12-11 RX ORDER — FUROSEMIDE 20 MG/1
10-20 TABLET ORAL DAILY PRN
Qty: 30 TABLET | Refills: 3 | Status: SHIPPED | OUTPATIENT
Start: 2023-12-11 | End: 2024-02-27 | Stop reason: SDUPTHER

## 2023-12-18 RX ORDER — DONEPEZIL HYDROCHLORIDE 10 MG/1
10 TABLET, FILM COATED ORAL NIGHTLY
Qty: 30 TABLET | Refills: 0 | Status: SHIPPED | OUTPATIENT
Start: 2023-12-18 | End: 2024-01-16 | Stop reason: SDUPTHER

## 2023-12-18 RX ORDER — MEMANTINE HYDROCHLORIDE 10 MG/1
10 TABLET ORAL DAILY
Qty: 30 TABLET | Refills: 0 | Status: SHIPPED | OUTPATIENT
Start: 2023-12-18 | End: 2024-01-16 | Stop reason: SDUPTHER

## 2023-12-18 NOTE — TELEPHONE ENCOUNTER
----- Message from Dodie Zarate sent at 12/18/2023  8:46 AM CST -----  .Type:  RX Refill Request    Who Called: Beverly  Refill or New Rx:Refill  RX Name and Strength:memantine (NAMENDA) 10 MG Tab  How is the patient currently taking it? (ex. 1XDay):2/ day  Is this a 30 day or 90 day RX:30  Preferred Pharmacy with phone number:[facility] or Mail Order:Local  Ordering Provider:Debby  Would the patient rather a call back or a response via [facility]?   Best Call Back Number:[phone]  Additional Information: memantine (NAMENDA) 10 MG Tab       .Type:  RX Refill Request    Who Called: Beverly  Refill or New Rx:Refill  RX Name and Strength:donepeziL (ARICEPT) 10 MG tablet  How is the patient currently taking it? (ex. 1XDay):1/ day  Is this a 30 day or 90 day RX:30  Preferred Pharmacy with phone number:[facility]  Local or Mail Order:Local  Ordering Provider:Debby  Would the patient rather a call back or a response via [facility]?   Best Call Back Number:[phone]  Additional Information: donepeziL (ARICEPT) 10 MG tablet

## 2023-12-19 DIAGNOSIS — M62.89 MUSCLE STIFFNESS: Primary | ICD-10-CM

## 2023-12-19 RX ORDER — CYCLOBENZAPRINE HCL 10 MG
10 TABLET ORAL 3 TIMES DAILY PRN
Qty: 30 TABLET | Refills: 0 | Status: SHIPPED | OUTPATIENT
Start: 2023-12-19 | End: 2024-01-16 | Stop reason: SINTOL

## 2024-01-16 DIAGNOSIS — G30.9 ALZHEIMER'S DISEASE: Primary | ICD-10-CM

## 2024-01-16 DIAGNOSIS — F02.80 ALZHEIMER'S DISEASE: Primary | ICD-10-CM

## 2024-01-16 RX ORDER — MEMANTINE HYDROCHLORIDE 10 MG/1
10 TABLET ORAL DAILY
Qty: 90 TABLET | Refills: 0 | Status: SHIPPED | OUTPATIENT
Start: 2024-01-16 | End: 2024-03-11 | Stop reason: SDUPTHER

## 2024-01-16 RX ORDER — DONEPEZIL HYDROCHLORIDE 10 MG/1
10 TABLET, FILM COATED ORAL NIGHTLY
Qty: 30 TABLET | Refills: 0 | Status: SHIPPED | OUTPATIENT
Start: 2024-01-16 | End: 2024-02-27 | Stop reason: SDUPTHER

## 2024-01-22 DIAGNOSIS — M62.89 MUSCLE STIFFNESS: Primary | ICD-10-CM

## 2024-01-22 RX ORDER — CYCLOBENZAPRINE HCL 10 MG
10 TABLET ORAL 3 TIMES DAILY PRN
COMMUNITY
End: 2024-01-22 | Stop reason: SDUPTHER

## 2024-01-22 RX ORDER — CYCLOBENZAPRINE HCL 10 MG
10 TABLET ORAL 3 TIMES DAILY PRN
Qty: 30 TABLET | Refills: 0 | Status: SHIPPED | OUTPATIENT
Start: 2024-01-22 | End: 2024-02-26 | Stop reason: SDUPTHER

## 2024-02-19 ENCOUNTER — LAB VISIT (OUTPATIENT)
Dept: LAB | Facility: HOSPITAL | Age: 69
End: 2024-02-19
Attending: INTERNAL MEDICINE
Payer: MEDICARE

## 2024-02-19 DIAGNOSIS — I35.0 AORTIC VALVE STENOSIS, ETIOLOGY OF CARDIAC VALVE DISEASE UNSPECIFIED: Primary | ICD-10-CM

## 2024-02-19 DIAGNOSIS — I25.118 ATHSCL HEART DISEASE OF NATIVE COR ART W OTH ANG PCTRS: ICD-10-CM

## 2024-02-19 DIAGNOSIS — Z82.49 FAMILY HISTORY OF ISCHEMIC HEART DISEASE: ICD-10-CM

## 2024-02-19 DIAGNOSIS — I34.1 MITRAL VALVE PROLAPSE: ICD-10-CM

## 2024-02-19 DIAGNOSIS — R94.31 NONSPECIFIC ABNORMAL ELECTROCARDIOGRAM (ECG) (EKG): ICD-10-CM

## 2024-02-19 DIAGNOSIS — I27.21 PULMONARY ARTERY HYPERTENSION: ICD-10-CM

## 2024-02-19 LAB
ALBUMIN SERPL-MCNC: 4 G/DL (ref 3.4–4.8)
ALBUMIN/GLOB SERPL: 1.5 RATIO (ref 1.1–2)
ALP SERPL-CCNC: 57 UNIT/L (ref 40–150)
ALT SERPL-CCNC: 32 UNIT/L (ref 0–55)
AST SERPL-CCNC: 30 UNIT/L (ref 5–34)
BILIRUB SERPL-MCNC: 0.3 MG/DL
BUN SERPL-MCNC: 15.1 MG/DL (ref 9.8–20.1)
CALCIUM SERPL-MCNC: 10.1 MG/DL (ref 8.4–10.2)
CHLORIDE SERPL-SCNC: 108 MMOL/L (ref 98–107)
CHOLEST SERPL-MCNC: 150 MG/DL
CHOLEST/HDLC SERPL: 2 {RATIO} (ref 0–5)
CO2 SERPL-SCNC: 30 MMOL/L (ref 23–31)
CREAT SERPL-MCNC: 0.94 MG/DL (ref 0.55–1.02)
GFR SERPLBLD CREATININE-BSD FMLA CKD-EPI: >60 MLS/MIN/1.73/M2
GLOBULIN SER-MCNC: 2.7 GM/DL (ref 2.4–3.5)
GLUCOSE SERPL-MCNC: 102 MG/DL (ref 82–115)
HDLC SERPL-MCNC: 73 MG/DL (ref 35–60)
LDLC SERPL CALC-MCNC: 59 MG/DL (ref 50–140)
POTASSIUM SERPL-SCNC: 3.8 MMOL/L (ref 3.5–5.1)
PROT SERPL-MCNC: 6.7 GM/DL (ref 5.8–7.6)
SODIUM SERPL-SCNC: 145 MMOL/L (ref 136–145)
TRIGL SERPL-MCNC: 91 MG/DL (ref 37–140)
VLDLC SERPL CALC-MCNC: 18 MG/DL

## 2024-02-19 PROCEDURE — 36415 COLL VENOUS BLD VENIPUNCTURE: CPT

## 2024-02-19 PROCEDURE — 80061 LIPID PANEL: CPT

## 2024-02-19 PROCEDURE — 80053 COMPREHEN METABOLIC PANEL: CPT

## 2024-02-22 ENCOUNTER — TELEPHONE (OUTPATIENT)
Dept: FAMILY MEDICINE | Facility: CLINIC | Age: 69
End: 2024-02-22
Payer: MEDICARE

## 2024-02-26 ENCOUNTER — TELEPHONE (OUTPATIENT)
Dept: FAMILY MEDICINE | Facility: CLINIC | Age: 69
End: 2024-02-26
Payer: MEDICARE

## 2024-02-26 DIAGNOSIS — M62.89 MUSCLE STIFFNESS: ICD-10-CM

## 2024-02-26 RX ORDER — CYCLOBENZAPRINE HCL 10 MG
10 TABLET ORAL 3 TIMES DAILY PRN
Qty: 30 TABLET | Refills: 0 | Status: SHIPPED | OUTPATIENT
Start: 2024-02-26

## 2024-02-27 RX ORDER — DONEPEZIL HYDROCHLORIDE 10 MG/1
10 TABLET, FILM COATED ORAL NIGHTLY
Qty: 30 TABLET | Refills: 0 | Status: SHIPPED | OUTPATIENT
Start: 2024-02-27 | End: 2024-04-01 | Stop reason: SDUPTHER

## 2024-02-27 RX ORDER — GLIPIZIDE 10 MG/1
10 TABLET ORAL
COMMUNITY
End: 2024-02-27 | Stop reason: SDUPTHER

## 2024-02-27 RX ORDER — FUROSEMIDE 20 MG/1
10-20 TABLET ORAL DAILY PRN
Qty: 90 TABLET | Refills: 0 | Status: SHIPPED | OUTPATIENT
Start: 2024-02-27 | End: 2024-05-24 | Stop reason: SDUPTHER

## 2024-02-27 RX ORDER — GLIPIZIDE 10 MG/1
10 TABLET ORAL
Qty: 60 TABLET | Refills: 0 | Status: SHIPPED | OUTPATIENT
Start: 2024-02-27 | End: 2024-03-22 | Stop reason: SDUPTHER

## 2024-02-27 NOTE — TELEPHONE ENCOUNTER
Patient called needing refill on medications.    Was not sure of donepezil Aricept, considering it is  does that mean patient has to schedule a visit in order to have it refilled? Just wanted to re-confirm. Thanks!

## 2024-03-11 ENCOUNTER — LAB VISIT (OUTPATIENT)
Dept: LAB | Facility: HOSPITAL | Age: 69
End: 2024-03-11
Attending: STUDENT IN AN ORGANIZED HEALTH CARE EDUCATION/TRAINING PROGRAM
Payer: MEDICARE

## 2024-03-11 DIAGNOSIS — E78.2 MIXED HYPERLIPIDEMIA: ICD-10-CM

## 2024-03-11 DIAGNOSIS — F02.80 ALZHEIMER'S DISEASE: ICD-10-CM

## 2024-03-11 DIAGNOSIS — G30.9 ALZHEIMER'S DISEASE: ICD-10-CM

## 2024-03-11 DIAGNOSIS — R79.9 ABNORMAL FINDING OF BLOOD CHEMISTRY, UNSPECIFIED: ICD-10-CM

## 2024-03-11 LAB
ALBUMIN SERPL-MCNC: 4.2 G/DL (ref 3.4–4.8)
ALBUMIN/GLOB SERPL: 1.5 RATIO (ref 1.1–2)
ALP SERPL-CCNC: 49 UNIT/L (ref 40–150)
ALT SERPL-CCNC: 32 UNIT/L (ref 0–55)
AST SERPL-CCNC: 31 UNIT/L (ref 5–34)
BASOPHILS # BLD AUTO: 0.03 X10(3)/MCL
BASOPHILS NFR BLD AUTO: 0.6 %
BILIRUB SERPL-MCNC: 0.6 MG/DL
BUN SERPL-MCNC: 13.3 MG/DL (ref 9.8–20.1)
CALCIUM SERPL-MCNC: 10 MG/DL (ref 8.4–10.2)
CHLORIDE SERPL-SCNC: 106 MMOL/L (ref 98–107)
CHOLEST SERPL-MCNC: 157 MG/DL
CHOLEST/HDLC SERPL: 2 {RATIO} (ref 0–5)
CO2 SERPL-SCNC: 29 MMOL/L (ref 23–31)
CREAT SERPL-MCNC: 0.91 MG/DL (ref 0.55–1.02)
EOSINOPHIL # BLD AUTO: 0.17 X10(3)/MCL (ref 0–0.9)
EOSINOPHIL NFR BLD AUTO: 3.4 %
ERYTHROCYTE [DISTWIDTH] IN BLOOD BY AUTOMATED COUNT: 12.6 % (ref 11.5–17)
EST. AVERAGE GLUCOSE BLD GHB EST-MCNC: 105.4 MG/DL
GFR SERPLBLD CREATININE-BSD FMLA CKD-EPI: >60 MLS/MIN/1.73/M2
GLOBULIN SER-MCNC: 2.8 GM/DL (ref 2.4–3.5)
GLUCOSE SERPL-MCNC: 50 MG/DL (ref 82–115)
HBA1C MFR BLD: 5.3 %
HCT VFR BLD AUTO: 44.6 % (ref 37–47)
HDLC SERPL-MCNC: 73 MG/DL (ref 35–60)
HGB BLD-MCNC: 15 G/DL (ref 12–16)
IMM GRANULOCYTES # BLD AUTO: 0.01 X10(3)/MCL (ref 0–0.04)
IMM GRANULOCYTES NFR BLD AUTO: 0.2 %
LDLC SERPL CALC-MCNC: 70 MG/DL (ref 50–140)
LYMPHOCYTES # BLD AUTO: 2.09 X10(3)/MCL (ref 0.6–4.6)
LYMPHOCYTES NFR BLD AUTO: 41.3 %
MCH RBC QN AUTO: 30.4 PG (ref 27–31)
MCHC RBC AUTO-ENTMCNC: 33.6 G/DL (ref 33–36)
MCV RBC AUTO: 90.3 FL (ref 80–94)
MONOCYTES # BLD AUTO: 0.49 X10(3)/MCL (ref 0.1–1.3)
MONOCYTES NFR BLD AUTO: 9.7 %
NEUTROPHILS # BLD AUTO: 2.27 X10(3)/MCL (ref 2.1–9.2)
NEUTROPHILS NFR BLD AUTO: 44.8 %
NRBC BLD AUTO-RTO: 0 %
PLATELET # BLD AUTO: 244 X10(3)/MCL (ref 130–400)
PMV BLD AUTO: 9.5 FL (ref 7.4–10.4)
POTASSIUM SERPL-SCNC: 3.7 MMOL/L (ref 3.5–5.1)
PROT SERPL-MCNC: 7 GM/DL (ref 5.8–7.6)
RBC # BLD AUTO: 4.94 X10(6)/MCL (ref 4.2–5.4)
SODIUM SERPL-SCNC: 146 MMOL/L (ref 136–145)
TRIGL SERPL-MCNC: 69 MG/DL (ref 37–140)
TSH SERPL-ACNC: 1.27 UIU/ML (ref 0.35–4.94)
VLDLC SERPL CALC-MCNC: 14 MG/DL
WBC # SPEC AUTO: 5.06 X10(3)/MCL (ref 4.5–11.5)

## 2024-03-11 PROCEDURE — 80061 LIPID PANEL: CPT

## 2024-03-11 PROCEDURE — 80053 COMPREHEN METABOLIC PANEL: CPT

## 2024-03-11 PROCEDURE — 84443 ASSAY THYROID STIM HORMONE: CPT

## 2024-03-11 PROCEDURE — 83036 HEMOGLOBIN GLYCOSYLATED A1C: CPT

## 2024-03-11 PROCEDURE — 36415 COLL VENOUS BLD VENIPUNCTURE: CPT

## 2024-03-11 PROCEDURE — 85025 COMPLETE CBC W/AUTO DIFF WBC: CPT

## 2024-03-11 RX ORDER — MEMANTINE HYDROCHLORIDE 10 MG/1
10 TABLET ORAL DAILY
Qty: 30 TABLET | Refills: 0 | Status: SHIPPED | OUTPATIENT
Start: 2024-03-11 | End: 2024-03-13 | Stop reason: SDUPTHER

## 2024-03-11 NOTE — TELEPHONE ENCOUNTER
Called/Attempted to call patient in regards to appointment on 3/18/2024 for Medicare wellness.    Left message reminding patient of appointment. The prerequisites were already done. Encouraged the patient to call us back if they have any further questions, comments, or concerns.

## 2024-03-13 DIAGNOSIS — G30.9 ALZHEIMER'S DISEASE: ICD-10-CM

## 2024-03-13 DIAGNOSIS — F02.80 ALZHEIMER'S DISEASE: ICD-10-CM

## 2024-03-13 RX ORDER — MEMANTINE HYDROCHLORIDE 10 MG/1
10 TABLET ORAL DAILY
Qty: 90 TABLET | Refills: 0 | Status: SHIPPED | OUTPATIENT
Start: 2024-03-13 | End: 2024-04-17 | Stop reason: SDUPTHER

## 2024-03-18 ENCOUNTER — OFFICE VISIT (OUTPATIENT)
Dept: FAMILY MEDICINE | Facility: CLINIC | Age: 69
End: 2024-03-18
Payer: MEDICARE

## 2024-03-18 ENCOUNTER — DOCUMENTATION ONLY (OUTPATIENT)
Dept: FAMILY MEDICINE | Facility: CLINIC | Age: 69
End: 2024-03-18

## 2024-03-18 VITALS
WEIGHT: 150.19 LBS | TEMPERATURE: 98 F | BODY MASS INDEX: 24.14 KG/M2 | RESPIRATION RATE: 17 BRPM | DIASTOLIC BLOOD PRESSURE: 70 MMHG | HEIGHT: 66 IN | SYSTOLIC BLOOD PRESSURE: 120 MMHG | OXYGEN SATURATION: 99 % | HEART RATE: 96 BPM

## 2024-03-18 DIAGNOSIS — F33.0 MILD EPISODE OF RECURRENT MAJOR DEPRESSIVE DISORDER: ICD-10-CM

## 2024-03-18 DIAGNOSIS — Z12.11 ENCOUNTER FOR SCREENING FOR MALIGNANT NEOPLASM OF COLON: ICD-10-CM

## 2024-03-18 DIAGNOSIS — M85.80 OSTEOPENIA, UNSPECIFIED LOCATION: Chronic | ICD-10-CM

## 2024-03-18 DIAGNOSIS — G20.A1 PARKINSON'S DISEASE WITHOUT DYSKINESIA OR FLUCTUATING MANIFESTATIONS: ICD-10-CM

## 2024-03-18 DIAGNOSIS — Z00.00 WELLNESS EXAMINATION: ICD-10-CM

## 2024-03-18 DIAGNOSIS — F41.1 GENERALIZED ANXIETY DISORDER: Chronic | ICD-10-CM

## 2024-03-18 DIAGNOSIS — G30.9 ALZHEIMER'S DISEASE: ICD-10-CM

## 2024-03-18 DIAGNOSIS — R73.09 OTHER ABNORMAL GLUCOSE: ICD-10-CM

## 2024-03-18 DIAGNOSIS — F31.9 BIPOLAR I DISORDER: ICD-10-CM

## 2024-03-18 DIAGNOSIS — Z12.31 BREAST CANCER SCREENING BY MAMMOGRAM: ICD-10-CM

## 2024-03-18 DIAGNOSIS — I25.10 ARTERIOSCLEROSIS OF CORONARY ARTERY: Chronic | ICD-10-CM

## 2024-03-18 DIAGNOSIS — F02.80 ALZHEIMER'S DISEASE: ICD-10-CM

## 2024-03-18 DIAGNOSIS — E78.2 MIXED HYPERLIPIDEMIA: ICD-10-CM

## 2024-03-18 PROCEDURE — G0439 PPPS, SUBSEQ VISIT: HCPCS | Mod: ,,, | Performed by: STUDENT IN AN ORGANIZED HEALTH CARE EDUCATION/TRAINING PROGRAM

## 2024-03-18 NOTE — PROGRESS NOTES
Patient ID: [id number]     Chief Complaint: Medicare AWV    HPI:      Disclaimer:  This note is prepared using voice recognition software and as such is likely to have errors despite attempts at proofreading. Please contact me for questions.     Beverly Shah is a 68 y.o. female here today for a Medicare Wellness.     A separate E/M code has been provided to evaluate additional complaints that the patient would like addressed during the dedicated Medicare Wellness Exam.    The patient has following issues to discuss    Acute Issues-  Expand All Collapse All     Patient ID: [id number]      Chief Complaint: Medicare AWV Follow Up           HPI:      Disclaimer:  This note is prepared using voice recognition software and as such is likely to have errors despite attempts at proofreading. Please contact me for questions.      Beverly Shah is a 67 y.o. female here today for a Medicare Wellness. No other complaints today.  Overall feeling great.  Recently started physical therapy for Parkinson's disease and feels great.  Quest for some p.r.n. Flexeril because of unknown muscle stiffness due to Parkinson's     A separate E/M code has been provided to evaluate additional complaints that the patient would like addressed during the dedicated Medicare Wellness Exam.        Chronic Issues-  Alzheimer's disease  Was diagnosed almost 6 years ago   Following Dr. Diana Robbins   Currently on memantine 10 mg once daily and Aricept 10 mg once nightly  Stable     Parkinson's  Patient is currently on levodopa carbidopa and is stable     Anxiety disorder, unspecified  Bipolar disorder, unspecified  Depression  Currently on Xanax 1 mg p.r.n. nightly, Rexulti 2 mg nightly  Overall feels great denies of any SI/HI     CAD (coronary artery disease)  HLD  Has a history of mitral valve prolapse   Following Dr. Matt Olson   On any statins  The 10-year ASCVD risk score (Abiola DUKES, et al., 2019) is: 5.7%    Values used to calculate the  score:      Age: 68 years      Sex: Female      Is Non- : No      Diabetic: No      Tobacco smoker: No      Systolic Blood Pressure: 120 mmHg      Is BP treated: No      HDL Cholesterol: 73 mg/dL      Total Cholesterol: 157 mg/dL      GERD (gastroesophageal reflux disease)  Stable  Not on protonix     Rheumatoid arthritis, unspecified  DDD (degenerative disc disease), cervical  Following Dr. Miranda     Vitamin D deficiency   Taking over-the-counter vitamin-D supplementations 300 IU every day.    Osteopenia  Has not been walking   Has been doing Vit D supplementations               Past Surgical History:   Procedure Laterality Date    Left ankle surgery Right     Left wrist surgery Left        Review of patient's allergies indicates:  No Known Allergies    Current Outpatient Medications   Medication Instructions    ALPRAZolam (XANAX) 1 mg, Oral, 2 times daily PRN    atorvastatin (LIPITOR) 10 mg, Oral, Daily    carbidopa-levodopa  mg (SINEMET)  mg per tablet 1.5 tabs TID    celecoxib (CELEBREX) 100 mg, Oral, 2 times daily PRN    cyclobenzaprine (FLEXERIL) 10 mg, Oral, 3 times daily PRN    dextroamphetamine-amphetamine (ADDERALL) 20 mg tablet 1 tablet, Oral, 3 times daily    donepeziL (ARICEPT) 10 mg, Oral, Nightly    furosemide (LASIX) 10-20 mg, Oral, Daily PRN    glipiZIDE (GLUCOTROL) 10 mg, Oral, 2 times daily before meals    MAGNESIUM ORAL 1 tablet, Oral, Daily    melatonin 10 mg Tab 1 tablet, Oral, As needed (PRN)    memantine (NAMENDA) 10 mg, Oral, Daily    REXULTI 2 mg Tab 1 tablet, Oral, Nightly       Social History     Socioeconomic History    Marital status:    Tobacco Use    Smoking status: Never     Passive exposure: Never    Smokeless tobacco: Never   Substance and Sexual Activity    Alcohol use: Not Currently    Drug use: Never    Sexual activity: Not Currently        Family History   Problem Relation Age of Onset    Alzheimer's disease Mother     Heart  disease Father         Patient Care Team:  Vanessa Guardado MD as PCP - General (Family Medicine)  Vanessa Guardado MD as PCP - Family Medicine (Family Medicine)       Subjective:     ROS    See HPI for details    Constitutional: Denies Change in appetite. Denies Chills. Denies Fever. Denies Night sweats.  Respiratory: Denies Cough. Denies Shortness of breath. Denies Shortness of breath with exertion. Denies Wheezing.  Cardiovascular: DeniesChest pain at rest. Denies Chest pain with exertion. Denies Irregular heartbeat. Denies Palpitations. Denies Edema.  Gastrointestinal: Denies Abdominal pain. DeniesDiarrhea. Denies Nausea. Denies Vomiting. Denies Hematemesis or Hematochezia.  Integumentary: Denies Rash. Denies Itching. Denies Dry skin.  Neurologic: Denies Dizziness. Denies Fainting. Denies Headache.  Psychiatric: Denies Depression. Denies Anxiety. Denies Suicidal/Homicidal ideations.    All Other ROS: Negative except as stated in HPI.     Patient Reported Health Risk Assessments:  What is your age?: 65-69  Are you male or female?: Female  During the past four weeks, how much have you been bothered by emotional problems such as feeling anxious, depressed, irritable, sad, or downhearted and blue?: Not at all  During the past five weeks, has your physical and/or emotional health limited your social activities with family, friends, neighbors, or groups?: Not at all  During the past four weeks, how much bodily pain have you generally had?: No pain  During the past four weeks, was someone available to help if you needed and wanted help?: Yes, as much as I wanted  During the past four weeks, what was the hardest physical activity you could do for at least two minutes?: Light  Can you get to places out of walking distance without help?  (For example, can you travel alone on buses or taxis, or drive your own car?): Yes  Can you go shopping for groceries or clothes without someone's help?: Yes  Can you prepare your  own meals?: Yes  Can you do your own housework without help?: Yes  Because of any health problems, do you need the help of another person with your personal care needs such as eating, bathing, dressing, or getting around the house?: No  Can you handle your own money without help?: Yes  During the past four weeks, how would you rate your health in general?: Good  How have things been going for you during the past four weeks?: Pretty well  Are you having difficulties driving your car?: No  Do you always fasten your seat belt when you are in a car?: Yes, usually  How often in the past four weeks have you been bothered by falling or dizzy when standing up?: Never  How often in the past four weeks have you been bothered by sexual problems?: Never  How often in the past four weeks have you been bothered by trouble eating well?: Never  How often in the past four weeks have you been bothered by teeth or denture problems?: Never  How often in the past four weeks have you been bothered with problems using the telephone?: Never  How often in the past four weeks have you been bothered by tiredness or fatigue?: Seldom  Have you fallen two or more times in the past year?: No  Are you afraid of falling?: No  Are you a smoker?: No  During the past four weeks, how many drinks of wine, beer, or other alcoholic beverages did you have?: No alcohol at all  Do you exercise for about 20 minutes three or more days a week?: Yes, some of the time  Have you been given any information to help you with hazards in your house that might hurt you?: No  Have you been given any information to help you with keeping track of your medications?: Yes  How often do you have trouble taking medicines the way you've been told to take them?: I always take them as prescribed  How confident are you that you can control and manage most of your health problems?: Very confident  What is your race? (Check all that apply.):     Objective:     Visit  "Vitals  /70 (BP Location: Right arm, Patient Position: Sitting, BP Method: Large (Manual))   Pulse 96   Temp 98.3 °F (36.8 °C) (Oral)   Resp 17   Ht 5' 6" (1.676 m)   Wt 68.1 kg (150 lb 3.2 oz)   SpO2 99%   BMI 24.24 kg/m²       Physical Exam    General: Alert and oriented, No acute distress.  Neck/Thyroid: Supple, Non-tender  Respiratory: Clear to auscultation bilaterally  Cardiovascular: Regular rate and rhythm  Gastrointestinal: Soft, Non-tender. No palpable organomegaly.  Musculoskeletal: Normal range of motion.  Neurologic: No focal deficits  Psychiatric: Normal interaction, Coherent speech, Euthymic mood, Appropriate affect       Assessment:       ICD-10-CM ICD-9-CM   1. At risk for abnormal blood glucose level  Z91.89 V49.89   2. Wellness examination  Z00.00 V70.0   3. Alzheimer's disease  G30.9 331.0    F02.80    4. Bipolar I disorder  F31.9 296.7   5. Parkinson's disease without dyskinesia or fluctuating manifestations  G20.A1 332.0   6. Depressive disorder  F32.A 311   7. Generalized anxiety disorder  F41.1 300.02   8. Arteriosclerosis of coronary artery  I25.10 414.00   9. Osteopenia, unspecified location  M85.80 733.90   10. Encounter for screening for malignant neoplasm of colon  Z12.11 V76.51   11. Breast cancer screening by mammogram  Z12.31 V76.12   12. Mixed hyperlipidemia  E78.2 272.2   13. Other abnormal glucose  R73.09 790.29        Plan:       Medicare Annual Wellness and Personalized Prevention Plan:     Fall Risk + Home Safety + Living Situation + Whisper Test + Depression Screen + CAGE + Cognitive Impairment Screen + ADL Screen + Timed Get Up and Go + Nutrition Screen + PAQ Screen + Health Risk Assessment all reviewed.          No data to display                  3/18/2024     8:00 AM 11/14/2023     8:30 AM 9/15/2023     8:30 AM 3/16/2023     8:00 AM 3/15/2023     8:00 AM 9/15/2022     8:30 AM   Fall Risk Assessment - Outpatient   Mobility Status Ambulatory Ambulatory Ambulatory " Ambulatory Ambulatory Ambulatory   Number of falls 0 0 0 0 0 0   Identified as fall risk False False False False False False                   Depression Screening  Over the past two weeks, has the patient felt down, depressed, or hopeless?: No  Over the past two weeks, has the patient felt little interest or pleasure in doing things?: No  Functional Ability/Safety Screening  Was the patient's timed Up & Go test unsteady or longer than 30 seconds?: No  Does the patient need help with phone, transportation, shopping, preparing meals, housework, laundry, meds, or managing money?: No  Does the patient's home have rugs in the hallway, lack grab bars in the bathroom, lack handrails on the stairs or have poor lighting?: No  Have you noticed any hearing difficulties?: No  Cognitive Function (Assessed through direct observation with due consideration of information obtained by way of patient reports and/or concerns raised by family, friends, caretakers, or others)    Does the patient repeat questions/statements in the same day?: No  Does the patient have trouble remembering the date, year, and time?: No  Does the patient have difficulty managing finances?: No  Does the patient have a decreased sense of direction?: No         Alcohol/Tobacco Use - Denies of smoking and alcohol intake  CVD Risk Factors - Reviewed as above @  Obesity/Physical Activity - Body mass index is 24.24 kg/m².. Encouraged daily 30 minute physical activity x 5 days per week.  Opioid Screening: Patient medication list reviewed, patient is not taking prescription opioids. Patient is not using additional opioids than prescribed. Patient is not at risk of substance abuse based on this opioid use history.          Advance Care Planning     Date: 03/18/2024    [facility]  I engaged the patient in a voluntary conversation about advance care planning and we specifically addressed what the goals of care would be moving forward, in light of the patient's change in  clinical status, specifically when in hospital.  We did specifically address the patient's likely prognosis, which is good .  We explored the patient's values and preferences for future care.  The patient endorses that what is most important right now is to focus on comfort and QOL with my grand children    Accordingly, we have decided that the best plan to meet the patient's goals includes continuing with treatment    I did explain the role for hospice care at this stage of the patient's illness, including its ability to help the patient live with the best quality of life possible.  We will not be making a hospice referral.    I spent a total of 6 minutes engaging the patient in this advance care planning discussion.              Health Maintenance Topics with due status: Not Due       Topic Last Completion Date    TETANUS VACCINE 02/15/2022    Mammogram 10/05/2023    High Dose Statin 11/14/2023    Hemoglobin A1c (Prediabetes) 03/11/2024    Lipid Panel 03/11/2024          Vaccinations -   Immunization History   Administered Date(s) Administered    COVID-19, MRNA, LN-S, PF (MODERNA FULL 0.5 ML DOSE) 02/09/2021, 02/09/2021, 03/09/2021, 03/09/2021    COVID-19, MRNA, LN-S, PF (Pfizer) (Gray Cap) 04/06/2022    COVID-19, MRNA, LN-S, PF (Pfizer) (Purple Cap) 11/04/2021    COVID-19, mRNA, LNP-S, PF, temi-sucrose, 30 mcg/0.3 mL (Pfizer 2023 Ages 12+) 09/28/2023    COVID-19, mRNA, LNP-S, bivalent booster, PF (PFIZER OMICRON) 09/28/2022    Influenza 10/29/2007, 10/23/2013    Influenza (FLUAD) - Quadrivalent - Adjuvanted - PF *Preferred* (65+) 09/28/2023    Influenza - Quadrivalent 11/05/2015, 11/05/2015    Influenza - Quadrivalent - High Dose - PF (65 years and older) 09/29/2021, 09/28/2022    Influenza - Quadrivalent - MDCK - PF 10/14/2017, 10/06/2018, 09/28/2019    Influenza - Trivalent (ADULT) 10/29/2007, 10/23/2013    Influenza - Trivalent - PF (ADULT) 10/29/2007, 10/23/2013, 10/28/2014, 11/21/2016, 10/01/2017     Influenza A (H1N1) 2009 Monovalent - IM 01/12/2010    Pneumococcal Conjugate - 13 Valent 03/11/2021    Pneumococcal Polysaccharide - 23 Valent 01/01/2012, 03/14/2022    Tdap 01/01/2014, 05/24/2020, 02/15/2022    Zoster 10/20/2021, 12/28/2021    Zoster Recombinant 10/20/2021, 12/28/2021        1. Wellness examination  Lung Cancer-(50-80) Smoker/ Ex smoker- Never smoked  Colon Cancer Screening(45-75) - Colonoscopy ordered/declined.  [place] guard ordered  Cervical Cancer Screening (21-65)-Pap smear-WNL. Aged out  Breast Cancer Screening (40-74)- Last Mammogram-Normal.   Osteoporosis Screening(>65) - Last DEXA - Completed Dexa by Dr. Miranda. On fosamax.   Eye Exam - Last Eye Exam   Dental Exam - Recommend biannually    Diet discussed (healthy food choices, reduce portions and overall calorie intake)  Exercise 30-45 minutes 5x per week  Avoid excessive alcohol and tobacco if taking  Immunizations dicussed  Preventative exams discussed.     2. Alzheimer's disease  -     TSH; Future; Expected date: 03/18/2025    3. Bipolar I disorder  4. Parkinson's disease without dyskinesia or fluctuating manifestations  Overview:  Initially evaluated September 2020 for memory loss and tremors.  Memory loss had started about 2 years prior and she would already been started on Namenda and Aricept by her PCP.  The tremor she was noticing was noted to be in her jaw and neck.  It started earlier in the year of 2020.  She was noted to be on several psychoactive medications including Luvox, Abilify, and topiramate.  Initial evaluation was consistent with tardive dyskinesia and drug-induced parkinsonism.  She was started on trial of Ingrezza, but did not see any improvement in her symptoms.  A DaTSCAN was ultimately performed and was positive.  Keep scheduled visits per primary  Continue Namenda and Aricept as prescribed       5. Depressive disorder  6. Generalized anxiety disorder  PHQ-9 score today is 5   Continue Rx as prescribed  Continue  mental health  -     CBC Auto Differential; Future; Expected date: 03/18/2025  -     Comprehensive Metabolic Panel; Future; Expected date: 03/18/2025        7. Arteriosclerosis of coronary artery  Keep goal blood pressure less than 130/80, goal LDL less than 70 see less than 7, 35 minutes of brisk walking along with Mediterranean diet   Keep scheduled visit with Cardiology  -     Urinalysis; Future; Expected date: 03/18/2025    8. Osteopenia, unspecified location  Continue Fosamax as prescribed   -     Vitamin D; Future; Expected date: 03/18/2025    9. Encounter for screening for malignant neoplasm of colon  -     Cancel: Cologuard Screening (Multitarget Stool DNA); Future; Expected date: 03/18/2024  -     Cologuard Screening (Multitarget Stool DNA); Future; Expected date: 03/26/2024    10. Breast cancer screening by mammogram  -     Mammo Digital Screening Bilat w/ Cecil; Future; Expected date: 10/05/2024    11. Mixed hyperlipidemia  -     Lipid Panel; Future; Expected date: 03/18/2025  Continue statins as prescribed    12. Other abnormal glucose  -     Hemoglobin A1C; Future; Expected date: 03/18/2025            Medication List with Changes/Refills   Current Medications    ALPRAZOLAM (XANAX) 1 MG TABLET    Take 1 mg by mouth 2 (two) times daily as needed.       Start Date: 9/1/2022  End Date: --    ATORVASTATIN (LIPITOR) 10 MG TABLET    Take 1 tablet (10 mg total) by mouth once daily.       Start Date: 9/15/2023 End Date: 9/14/2024    CARBIDOPA-LEVODOPA  MG (SINEMET)  MG PER TABLET    1.5 tabs TID       Start Date: 11/14/2023End Date: --    CELECOXIB (CELEBREX) 100 MG CAPSULE    Take 100 mg by mouth 2 (two) times daily as needed.       Start Date: 8/20/2022 End Date: --    CYCLOBENZAPRINE (FLEXERIL) 10 MG TABLET    Take 1 tablet (10 mg total) by mouth 3 (three) times daily as needed for Muscle spasms.       Start Date: 2/26/2024 End Date: --    DEXTROAMPHETAMINE-AMPHETAMINE (ADDERALL) 20 MG TABLET     Take 1 tablet by mouth 3 (three) times daily.       Start Date: 9/1/2022  End Date: --    DONEPEZIL (ARICEPT) 10 MG TABLET    Take 1 tablet (10 mg total) by mouth nightly.       Start Date: 2/27/2024 End Date: 3/28/2024    FUROSEMIDE (LASIX) 20 MG TABLET    Take 0.5-1 tablets (10-20 mg total) by mouth daily as needed (to prevent swelling).       Start Date: 2/27/2024 End Date: --    GLIPIZIDE (GLUCOTROL) 10 MG TABLET    Take 1 tablet (10 mg total) by mouth 2 (two) times daily before meals.       Start Date: 2/27/2024 End Date: 3/28/2024    MAGNESIUM ORAL    Take 1 tablet by mouth Daily.       Start Date: 11/16/2021End Date: --    MELATONIN 10 MG TAB    Take 1 tablet by mouth as needed.       Start Date: --        End Date: --    MEMANTINE (NAMENDA) 10 MG TAB    Take 1 tablet (10 mg total) by mouth Daily.       Start Date: 3/13/2024 End Date: 6/11/2024    REXULTI 2 MG TAB    Take 1 tablet by mouth every evening.       Start Date: 8/30/2022 End Date: --          The patient's Health Maintenance was reviewed and the following appears to be due at this time:   Health Maintenance Due   Topic Date Due    Aspirin/Antiplatelet Therapy  Never done    RSV Vaccine (Age 60+ and Pregnant patients) (1 - 1-dose 60+ series) Never done    DEXA Scan  03/18/2023    Colorectal Cancer Screening  03/25/2024         Follow up in about 6 months (around 9/18/2024) for Depression. 1 AW.   In addition to their scheduled follow up, the patient has also been instructed to follow up on as needed basis.     Provided patient with a 5-10 year written screening schedule and personal prevention plan. Recommendations were developed using the USPSTF age appropriate recommendations. Education, counseling, and referrals were provided as needed. After Visit Summary printed and given to patient which includes a list of additional screenings\tests needed.

## 2024-03-22 RX ORDER — GLIPIZIDE 10 MG/1
10 TABLET ORAL
Qty: 180 TABLET | Refills: 0 | Status: SHIPPED | OUTPATIENT
Start: 2024-03-22 | End: 2024-06-20

## 2024-04-01 DIAGNOSIS — G30.9 ALZHEIMER'S DISEASE: Primary | ICD-10-CM

## 2024-04-01 DIAGNOSIS — F02.80 ALZHEIMER'S DISEASE: Primary | ICD-10-CM

## 2024-04-01 RX ORDER — DONEPEZIL HYDROCHLORIDE 10 MG/1
10 TABLET, FILM COATED ORAL NIGHTLY
Qty: 30 TABLET | Refills: 0 | Status: SHIPPED | OUTPATIENT
Start: 2024-04-01 | End: 2024-04-17 | Stop reason: SDUPTHER

## 2024-04-08 ENCOUNTER — TELEPHONE (OUTPATIENT)
Dept: FAMILY MEDICINE | Facility: CLINIC | Age: 69
End: 2024-04-08
Payer: MEDICARE

## 2024-04-08 LAB — NONINV COLON CA DNA+OCC BLD SCRN STL QL: NORMAL

## 2024-04-08 NOTE — TELEPHONE ENCOUNTER
----- Message from Vanessa Guardado MD sent at 4/8/2024  2:29 PM CDT -----  Please inform patient of abnormal colon cancer screening.    Patient will need to repeat Cologuard.    Addition of stabilization buffer to the specimen could not be verified. The patient will be contacted to initiate a new sample collection.    Thanks!  Dr. Guardado  
1st ATC pt LVM to call back  
not applicable

## 2024-04-08 NOTE — PROGRESS NOTES
Please inform patient of abnormal colon cancer screening.    Patient will need to repeat Cologuard.    Addition of stabilization buffer to the specimen could not be verified. The patient will be contacted to initiate a new sample collection.    Thanks!  Dr. Guardado

## 2024-04-17 DIAGNOSIS — F02.80 ALZHEIMER'S DISEASE: ICD-10-CM

## 2024-04-17 DIAGNOSIS — G30.9 ALZHEIMER'S DISEASE: ICD-10-CM

## 2024-04-17 RX ORDER — DONEPEZIL HYDROCHLORIDE 10 MG/1
10 TABLET, FILM COATED ORAL NIGHTLY
Qty: 30 TABLET | Refills: 0 | Status: SHIPPED | OUTPATIENT
Start: 2024-04-17 | End: 2024-05-24 | Stop reason: SDUPTHER

## 2024-04-17 RX ORDER — MEMANTINE HYDROCHLORIDE 10 MG/1
10 TABLET ORAL DAILY
Qty: 90 TABLET | Refills: 0 | Status: SHIPPED | OUTPATIENT
Start: 2024-04-17 | End: 2024-07-16

## 2024-04-28 LAB — NONINV COLON CA DNA+OCC BLD SCRN STL QL: NEGATIVE

## 2024-05-01 ENCOUNTER — TELEPHONE (OUTPATIENT)
Dept: FAMILY MEDICINE | Facility: CLINIC | Age: 69
End: 2024-05-01
Payer: MEDICARE

## 2024-05-01 NOTE — TELEPHONE ENCOUNTER
----- Message from Vanessa Guardado MD sent at 4/30/2024  5:21 PM CDT -----  Please inform patient of normal colon cancer screening. Repeat in 3 years.

## 2024-05-24 ENCOUNTER — TELEPHONE (OUTPATIENT)
Dept: FAMILY MEDICINE | Facility: CLINIC | Age: 69
End: 2024-05-24
Payer: MEDICARE

## 2024-05-24 DIAGNOSIS — G30.9 ALZHEIMER'S DISEASE: ICD-10-CM

## 2024-05-24 DIAGNOSIS — F02.80 ALZHEIMER'S DISEASE: ICD-10-CM

## 2024-05-24 DIAGNOSIS — R60.9 SWELLING: Primary | ICD-10-CM

## 2024-05-24 RX ORDER — DONEPEZIL HYDROCHLORIDE 10 MG/1
10 TABLET, FILM COATED ORAL NIGHTLY
Qty: 30 TABLET | Refills: 1 | Status: SHIPPED | OUTPATIENT
Start: 2024-05-24 | End: 2024-07-23

## 2024-05-24 RX ORDER — DONEPEZIL HYDROCHLORIDE 10 MG/1
10 TABLET, FILM COATED ORAL NIGHTLY
Qty: 30 TABLET | Refills: 1 | Status: CANCELLED | OUTPATIENT
Start: 2024-05-24 | End: 2024-07-23

## 2024-05-24 RX ORDER — FUROSEMIDE 20 MG/1
10-20 TABLET ORAL DAILY PRN
Qty: 90 TABLET | Refills: 0 | Status: SHIPPED | OUTPATIENT
Start: 2024-05-24

## 2024-06-27 DIAGNOSIS — R73.03 PREDIABETES: Primary | ICD-10-CM

## 2024-06-27 RX ORDER — GLIPIZIDE 10 MG/1
10 TABLET ORAL
Qty: 180 TABLET | Refills: 0 | Status: SHIPPED | OUTPATIENT
Start: 2024-06-27 | End: 2024-09-25

## 2024-07-22 DIAGNOSIS — F02.80 ALZHEIMER'S DISEASE: ICD-10-CM

## 2024-07-22 DIAGNOSIS — G30.9 ALZHEIMER'S DISEASE: ICD-10-CM

## 2024-07-22 RX ORDER — DONEPEZIL HYDROCHLORIDE 10 MG/1
10 TABLET, FILM COATED ORAL NIGHTLY
Qty: 30 TABLET | Refills: 1 | Status: SHIPPED | OUTPATIENT
Start: 2024-07-22 | End: 2024-09-20

## 2024-07-29 DIAGNOSIS — R60.9 SWELLING: ICD-10-CM

## 2024-07-29 DIAGNOSIS — F02.80 ALZHEIMER'S DISEASE: ICD-10-CM

## 2024-07-29 DIAGNOSIS — G30.9 ALZHEIMER'S DISEASE: ICD-10-CM

## 2024-07-29 RX ORDER — MEMANTINE HYDROCHLORIDE 10 MG/1
10 TABLET ORAL DAILY
Qty: 90 TABLET | Refills: 0 | Status: SHIPPED | OUTPATIENT
Start: 2024-07-29 | End: 2024-10-27

## 2024-07-29 RX ORDER — FUROSEMIDE 20 MG/1
10-20 TABLET ORAL DAILY PRN
Qty: 90 TABLET | Refills: 0 | Status: SHIPPED | OUTPATIENT
Start: 2024-07-29

## 2024-08-19 DIAGNOSIS — G30.9 ALZHEIMER'S DISEASE: ICD-10-CM

## 2024-08-19 DIAGNOSIS — F02.80 ALZHEIMER'S DISEASE: ICD-10-CM

## 2024-08-19 RX ORDER — DONEPEZIL HYDROCHLORIDE 10 MG/1
10 TABLET, FILM COATED ORAL NIGHTLY
Qty: 90 TABLET | Refills: 0 | Status: SHIPPED | OUTPATIENT
Start: 2024-08-19 | End: 2024-11-17

## 2024-08-29 DIAGNOSIS — E78.2 MIXED HYPERLIPIDEMIA: ICD-10-CM

## 2024-08-29 RX ORDER — ATORVASTATIN CALCIUM 10 MG/1
10 TABLET, FILM COATED ORAL DAILY
Qty: 90 TABLET | Refills: 3 | Status: SHIPPED | OUTPATIENT
Start: 2024-08-29 | End: 2025-08-29

## 2024-09-17 ENCOUNTER — DOCUMENTATION ONLY (OUTPATIENT)
Dept: FAMILY MEDICINE | Facility: CLINIC | Age: 69
End: 2024-09-17
Payer: MEDICARE

## 2024-09-24 DIAGNOSIS — R73.03 PREDIABETES: ICD-10-CM

## 2024-09-24 RX ORDER — GLIPIZIDE 10 MG/1
10 TABLET ORAL
Qty: 180 TABLET | Refills: 0 | Status: SHIPPED | OUTPATIENT
Start: 2024-09-24 | End: 2024-12-23

## 2024-11-11 ENCOUNTER — DOCUMENTATION ONLY (OUTPATIENT)
Dept: FAMILY MEDICINE | Facility: CLINIC | Age: 69
End: 2024-11-11

## 2024-11-11 ENCOUNTER — OFFICE VISIT (OUTPATIENT)
Dept: FAMILY MEDICINE | Facility: CLINIC | Age: 69
End: 2024-11-11
Payer: MEDICARE

## 2024-11-11 VITALS
HEIGHT: 66 IN | RESPIRATION RATE: 18 BRPM | DIASTOLIC BLOOD PRESSURE: 76 MMHG | WEIGHT: 134 LBS | BODY MASS INDEX: 21.53 KG/M2 | TEMPERATURE: 98 F | OXYGEN SATURATION: 99 % | HEART RATE: 63 BPM | SYSTOLIC BLOOD PRESSURE: 132 MMHG

## 2024-11-11 DIAGNOSIS — F33.0 MILD EPISODE OF RECURRENT MAJOR DEPRESSIVE DISORDER: ICD-10-CM

## 2024-11-11 DIAGNOSIS — Z12.31 BREAST CANCER SCREENING BY MAMMOGRAM: ICD-10-CM

## 2024-11-11 DIAGNOSIS — M85.80 OSTEOPENIA, UNSPECIFIED LOCATION: Chronic | ICD-10-CM

## 2024-11-11 DIAGNOSIS — Z78.0 MENOPAUSE: ICD-10-CM

## 2024-11-11 PROCEDURE — 99213 OFFICE O/P EST LOW 20 MIN: CPT | Mod: ,,, | Performed by: STUDENT IN AN ORGANIZED HEALTH CARE EDUCATION/TRAINING PROGRAM

## 2024-11-11 RX ORDER — ALENDRONATE SODIUM 70 MG/1
70 TABLET ORAL
COMMUNITY
Start: 2024-09-30

## 2024-11-11 NOTE — PROGRESS NOTES
Patient ID: [id number]     Chief Complaint: Follow-up and Depression        HPI:      Disclaimer:  This note is prepared using voice recognition software and as such is likely to have errors despite attempts at proofreading. Please contact me for questions.     Beverly Shah is a 68 y.o. female here today for the following      Acute Issues-  Overall doing good.  Is here for depression follow up.  Reports doing  good on [employer].  Has a history of parkinsonism and dementia.  Currently seeing Dr. Chamberlain. Stable     Chronic Issues-    -------------------------------------    Alzheimer's disease, unspecified (CODE)    Anxiety disorder, unspecified    Bipolar disorder, unspecified    CAD (coronary artery disease)    DDD (degenerative disc disease), cervical    Depression    GERD (gastroesophageal reflux disease)    Parkinson's disease    Rheumatoid arthritis, unspecified    Vitamin D deficiency        Past Surgical History:   Procedure Laterality Date    CATARACT EXTRACTION  05/01/2024    Left ankle surgery Right     Left wrist surgery Left        Review of patient's allergies indicates:  No Known Allergies    Current Outpatient Medications   Medication Instructions    ALPRAZolam (XANAX) 1 mg, 2 times daily PRN    atorvastatin (LIPITOR) 10 mg, Oral, Daily    carbidopa-levodopa  mg (SINEMET)  mg per tablet 1.5 tabs TID    celecoxib (CELEBREX) 100 mg, 2 times daily PRN    cyclobenzaprine (FLEXERIL) 10 mg, Oral, 3 times daily PRN    dextroamphetamine-amphetamine (ADDERALL) 20 mg tablet 1 tablet, 3 times daily    donepeziL (ARICEPT) 10 mg, Oral, Nightly    furosemide (LASIX) 10-20 mg, Oral, Daily PRN    glipiZIDE (GLUCOTROL) 10 mg, Oral, 2 times daily before meals    MAGNESIUM ORAL 1 tablet, Daily    melatonin 10 mg Tab 1 tablet, As needed (PRN)    memantine (NAMENDA) 10 mg, Oral, Daily    REXULTI 2 mg Tab 1 tablet, Nightly       Social History     Socioeconomic History    Marital status:    Tobacco  "Use    Smoking status: Never     Passive exposure: Never    Smokeless tobacco: Never   Substance and Sexual Activity    Alcohol use: Not Currently    Drug use: Never    Sexual activity: Not Currently        Family History   Problem Relation Name Age of Onset    Alzheimer's disease Mother      Heart disease Father          Patient Care Team:  Vanessa Guardado MD as PCP - General (Family Medicine)  Vanessa Guardado MD as PCP - Family Medicine (Family Medicine)     Subjective:     ROS    See HPI for details    Constitutional: Denies Change in appetite. Denies Chills. Denies Fever. Denies Night sweats.  Respiratory: Denies Cough. Denies Shortness of breath. Denies Shortness of breath with exertion. Denies Wheezing.  Cardiovascular: DeniesChest pain at rest. Denies Chest pain with exertion. Denies Irregular heartbeat. Denies Palpitations. Denies Edema.  Gastrointestinal: Denies Abdominal pain. DeniesDiarrhea. Denies Nausea. Denies Vomiting. Denies Hematemesis or Hematochezia.  Genitourinary: Denies Dysuria. Denies Urinary frequency. Denies Urinary urgency. Denies Blood in urine.  Endocrine: Denies Cold intolerance. Denies Excessive thirst. Denies Heat intolerance. Denies Weight loss. Denies Weight gain.  Musculoskeletal: Denies Painful joints. Denies Weakness.  Integumentary: Denies Rash. Denies Itching. Denies Dry skin.  Neurologic: Denies Dizziness. Denies Fainting. Denies Headache.  Psychiatric: Denies Depression. Denies Anxiety. Denies Suicidal/Homicidal ideations.    All Other ROS: Negative except as stated in HPI.  Objective:     Visit Vitals  /76 (BP Location: Right arm, Patient Position: Sitting)   Pulse 63   Temp 98.2 °F (36.8 °C) (Oral)   Resp 18   Ht 5' 6" (1.676 m)   Wt 60.8 kg (134 lb)   SpO2 99%   BMI 21.63 kg/m²       Physical Exam    General: Alert and oriented, No acute distress.  Respiratory: Clear to auscultation bilaterally; No wheezes, rales or rhonchi,Non-labored respirations, " Symmetrical chest wall expansion.  Cardiovascular: Regular rate and rhythm, S1/S2 normal, No murmurs, rubs or gallops.  Gastrointestinal: Soft, Non-tender, Non-distended, Normal bowel sounds, No palpable organomegaly.  Musculoskeletal: Normal range of motion.  Extremities: No clubbing, cyanosis or edema  Neurologic: No focal deficits  Psychiatric: Normal interaction, Coherent speech, Euthymic mood, Appropriate affect   Assessment:       ICD-10-CM ICD-9-CM   1. Osteopenia, unspecified location  M85.80 733.90   2. Menopause  Z78.0 627.2   3. Breast cancer screening by mammogram  Z12.31 V76.12   4. Mild episode of recurrent major depressive disorder  F33.0 296.31        Plan:     1. Osteopenia, unspecified location  2. Menopause  -     DXA Bone Density Axial Skeleton 1 or more sites; Future; Expected date: 11/11/2024  Continue Fosamax as prescribed   Continue vitamin-D/calcium supplementations  Continue outdoor physical activity      -     DXA Bone Density Axial Skeleton 1 or more sites; Future; Expected date: 11/11/2024    3. Breast cancer screening by mammogram  -     Mammo Digital Screening Bilat w/ Cecil; Future; Expected date: 11/11/2024    4. Mild episode of recurrent major depressive disorder  Continue Rx as prescribed with yoga         Medication List with Changes/Refills   Current Medications    ALPRAZOLAM (XANAX) 1 MG TABLET    Take 1 mg by mouth 2 (two) times daily as needed.       Start Date: 9/1/2022  End Date: --    ATORVASTATIN (LIPITOR) 10 MG TABLET    Take 1 tablet (10 mg total) by mouth once daily.       Start Date: 8/29/2024 End Date: 8/29/2025    CARBIDOPA-LEVODOPA  MG (SINEMET)  MG PER TABLET    1.5 tabs TID       Start Date: 11/14/2023End Date: --    CELECOXIB (CELEBREX) 100 MG CAPSULE    Take 100 mg by mouth 2 (two) times daily as needed.       Start Date: 8/20/2022 End Date: --    CYCLOBENZAPRINE (FLEXERIL) 10 MG TABLET    Take 1 tablet (10 mg total) by mouth 3 (three) times daily as  needed for Muscle spasms.       Start Date: 2/26/2024 End Date: --    DEXTROAMPHETAMINE-AMPHETAMINE (ADDERALL) 20 MG TABLET    Take 1 tablet by mouth 3 (three) times daily.       Start Date: 9/1/2022  End Date: --    DONEPEZIL (ARICEPT) 10 MG TABLET    Take 1 tablet (10 mg total) by mouth nightly.       Start Date: 8/19/2024 End Date: 11/17/2024    FUROSEMIDE (LASIX) 20 MG TABLET    Take 0.5-1 tablets (10-20 mg total) by mouth daily as needed (to prevent swelling).       Start Date: 7/29/2024 End Date: --    GLIPIZIDE (GLUCOTROL) 10 MG TABLET    Take 1 tablet (10 mg total) by mouth 2 (two) times daily before meals.       Start Date: 9/24/2024 End Date: 12/23/2024    MAGNESIUM ORAL    Take 1 tablet by mouth Daily.       Start Date: 11/16/2021End Date: --    MELATONIN 10 MG TAB    Take 1 tablet by mouth as needed.       Start Date: --        End Date: --    MEMANTINE (NAMENDA) 10 MG TAB    Take 1 tablet (10 mg total) by mouth Daily.       Start Date: 7/29/2024 End Date: 10/27/2024    REXULTI 2 MG TAB    Take 1 tablet by mouth every evening.       Start Date: 8/30/2022 End Date: --          Follow up for Medicare Wellness.   In addition to their scheduled follow up, the patient has also been instructed to follow up on as needed basis.     Future Appointments   Date Time Provider Department Center   3/20/2025  8:00 AM Vanessa Guardado MD [facility]

## 2024-11-21 DIAGNOSIS — F02.80 ALZHEIMER'S DISEASE: ICD-10-CM

## 2024-11-21 DIAGNOSIS — G30.9 ALZHEIMER'S DISEASE: ICD-10-CM

## 2024-11-21 RX ORDER — DONEPEZIL HYDROCHLORIDE 10 MG/1
10 TABLET, FILM COATED ORAL NIGHTLY
Qty: 90 TABLET | Refills: 0 | Status: SHIPPED | OUTPATIENT
Start: 2024-11-21 | End: 2025-02-19

## 2024-12-10 ENCOUNTER — TELEPHONE (OUTPATIENT)
Dept: FAMILY MEDICINE | Facility: CLINIC | Age: 69
End: 2024-12-10
Payer: MEDICARE

## 2024-12-10 DIAGNOSIS — G30.9 ALZHEIMER'S DISEASE: ICD-10-CM

## 2024-12-10 DIAGNOSIS — F02.80 ALZHEIMER'S DISEASE: ICD-10-CM

## 2024-12-10 RX ORDER — MEMANTINE HYDROCHLORIDE 10 MG/1
10 TABLET ORAL
Qty: 90 TABLET | Refills: 0 | Status: SHIPPED | OUTPATIENT
Start: 2024-12-10

## 2024-12-10 NOTE — TELEPHONE ENCOUNTER
----- Message from Rogelio sent at 12/10/2024 10:04 AM CST -----  .Type:  Needs Medical Advice    Who Called: [facility]   Symptoms (please be specific):    How long has patient had these symptoms:    Pharmacy name and phone #:    Would the patient rather a call back or a response via [facility]?   Best Call Back Number: [phone]  Additional Information: Requested to speak with the nurse directly re: this patient. Thanks,

## 2024-12-11 LAB — BCS RECOMMENDATION EXT: NORMAL

## 2024-12-16 ENCOUNTER — DOCUMENTATION ONLY (OUTPATIENT)
Dept: FAMILY MEDICINE | Facility: CLINIC | Age: 69
End: 2024-12-16
Payer: MEDICARE

## 2024-12-18 ENCOUNTER — DOCUMENTATION ONLY (OUTPATIENT)
Dept: FAMILY MEDICINE | Facility: CLINIC | Age: 69
End: 2024-12-18
Payer: MEDICARE

## 2024-12-20 DIAGNOSIS — R73.03 PREDIABETES: ICD-10-CM

## 2024-12-20 RX ORDER — GLIPIZIDE 10 MG/1
10 TABLET ORAL
Qty: 180 TABLET | Refills: 0 | Status: SHIPPED | OUTPATIENT
Start: 2024-12-20 | End: 2025-03-20

## 2025-01-14 DIAGNOSIS — R60.9 SWELLING: ICD-10-CM

## 2025-01-14 RX ORDER — FUROSEMIDE 20 MG/1
10-20 TABLET ORAL DAILY PRN
Qty: 90 TABLET | Refills: 0 | Status: SHIPPED | OUTPATIENT
Start: 2025-01-14

## 2025-01-28 DIAGNOSIS — F02.80 ALZHEIMER'S DISEASE: ICD-10-CM

## 2025-01-28 DIAGNOSIS — G30.9 ALZHEIMER'S DISEASE: ICD-10-CM

## 2025-01-28 RX ORDER — MEMANTINE HYDROCHLORIDE 10 MG/1
10 TABLET ORAL DAILY
Qty: 90 TABLET | Refills: 0 | Status: SHIPPED | OUTPATIENT
Start: 2025-01-28

## 2025-01-28 RX ORDER — DONEPEZIL HYDROCHLORIDE 10 MG/1
10 TABLET, FILM COATED ORAL NIGHTLY
Qty: 90 TABLET | Refills: 0 | Status: SHIPPED | OUTPATIENT
Start: 2025-01-28 | End: 2025-04-28

## 2025-02-28 DIAGNOSIS — G30.9 ALZHEIMER'S DISEASE: ICD-10-CM

## 2025-02-28 DIAGNOSIS — F02.80 ALZHEIMER'S DISEASE: ICD-10-CM

## 2025-02-28 RX ORDER — DONEPEZIL HYDROCHLORIDE 10 MG/1
10 TABLET, FILM COATED ORAL NIGHTLY
Qty: 90 TABLET | Refills: 0 | Status: SHIPPED | OUTPATIENT
Start: 2025-02-28 | End: 2025-05-29

## 2025-03-14 ENCOUNTER — TELEPHONE (OUTPATIENT)
Dept: FAMILY MEDICINE | Facility: CLINIC | Age: 70
End: 2025-03-14
Payer: MEDICARE

## 2025-04-16 DIAGNOSIS — R60.9 SWELLING: ICD-10-CM

## 2025-04-16 RX ORDER — FUROSEMIDE 20 MG/1
10-20 TABLET ORAL DAILY PRN
Qty: 90 TABLET | Refills: 0 | Status: SHIPPED | OUTPATIENT
Start: 2025-04-16

## 2025-05-02 ENCOUNTER — TELEPHONE (OUTPATIENT)
Dept: FAMILY MEDICINE | Facility: CLINIC | Age: 70
End: 2025-05-02
Payer: MEDICARE

## 2025-06-03 DIAGNOSIS — R60.9 SWELLING: ICD-10-CM

## 2025-06-03 RX ORDER — FUROSEMIDE 20 MG/1
10-20 TABLET ORAL DAILY PRN
Qty: 90 TABLET | Refills: 0 | Status: SHIPPED | OUTPATIENT
Start: 2025-06-03

## 2025-06-04 DIAGNOSIS — G30.9 ALZHEIMER'S DISEASE: ICD-10-CM

## 2025-06-04 DIAGNOSIS — F02.80 ALZHEIMER'S DISEASE: ICD-10-CM

## 2025-06-04 RX ORDER — MEMANTINE HYDROCHLORIDE 10 MG/1
10 TABLET ORAL DAILY
Qty: 90 TABLET | Refills: 0 | Status: SHIPPED | OUTPATIENT
Start: 2025-06-04

## 2025-06-10 ENCOUNTER — RESULTS FOLLOW-UP (OUTPATIENT)
Dept: FAMILY MEDICINE | Facility: CLINIC | Age: 70
End: 2025-06-10

## 2025-06-10 ENCOUNTER — DOCUMENTATION ONLY (OUTPATIENT)
Dept: FAMILY MEDICINE | Facility: CLINIC | Age: 70
End: 2025-06-10
Payer: MEDICARE

## 2025-06-23 DIAGNOSIS — G30.9 ALZHEIMER'S DISEASE: ICD-10-CM

## 2025-06-23 DIAGNOSIS — F02.80 ALZHEIMER'S DISEASE: ICD-10-CM

## 2025-06-23 RX ORDER — DONEPEZIL HYDROCHLORIDE 10 MG/1
10 TABLET, FILM COATED ORAL NIGHTLY
Qty: 90 TABLET | Refills: 0 | Status: SHIPPED | OUTPATIENT
Start: 2025-06-23 | End: 2025-09-21

## 2025-06-23 RX ORDER — MEMANTINE HYDROCHLORIDE 10 MG/1
10 TABLET ORAL DAILY
Qty: 90 TABLET | Refills: 0 | Status: SHIPPED | OUTPATIENT
Start: 2025-06-23

## 2025-06-24 ENCOUNTER — LAB VISIT (OUTPATIENT)
Dept: LAB | Facility: HOSPITAL | Age: 70
End: 2025-06-24
Attending: INTERNAL MEDICINE
Payer: MEDICARE

## 2025-06-24 DIAGNOSIS — Z82.49 FAMILY HISTORY OF ISCHEMIC HEART DISEASE: ICD-10-CM

## 2025-06-24 DIAGNOSIS — I65.23 BILATERAL CAROTID ARTERY OCCLUSION: Primary | ICD-10-CM

## 2025-06-24 DIAGNOSIS — I51.7 CARDIOMEGALY: ICD-10-CM

## 2025-06-24 DIAGNOSIS — I34.0 MITRAL VALVE INSUFFICIENCY, UNSPECIFIED ETIOLOGY: ICD-10-CM

## 2025-06-24 DIAGNOSIS — I34.1 MITRAL VALVE PROLAPSE: ICD-10-CM

## 2025-06-24 DIAGNOSIS — I25.118 ATHSCL HEART DISEASE OF NATIVE COR ART W OTH ANG PCTRS: ICD-10-CM

## 2025-06-24 DIAGNOSIS — I27.21 PULMONARY ARTERY HYPERTENSION: ICD-10-CM

## 2025-06-24 LAB
ALBUMIN SERPL-MCNC: 4 G/DL (ref 3.4–4.8)
ALBUMIN/GLOB SERPL: 1.5 RATIO (ref 1.1–2)
ALP SERPL-CCNC: 51 UNIT/L (ref 40–150)
ALT SERPL-CCNC: 27 UNIT/L (ref 0–55)
ANION GAP SERPL CALC-SCNC: 8 MEQ/L
AST SERPL-CCNC: 21 UNIT/L (ref 11–45)
BILIRUB SERPL-MCNC: 0.6 MG/DL
BNP BLD-MCNC: 90.4 PG/ML
BUN SERPL-MCNC: 18.1 MG/DL (ref 9.8–20.1)
CALCIUM SERPL-MCNC: 9.4 MG/DL (ref 8.4–10.2)
CHLORIDE SERPL-SCNC: 108 MMOL/L (ref 98–107)
CHOLEST SERPL-MCNC: 174 MG/DL
CHOLEST/HDLC SERPL: 2 {RATIO} (ref 0–5)
CO2 SERPL-SCNC: 30 MMOL/L (ref 23–31)
CREAT SERPL-MCNC: 0.79 MG/DL (ref 0.55–1.02)
CREAT/UREA NIT SERPL: 23
ERYTHROCYTE [DISTWIDTH] IN BLOOD BY AUTOMATED COUNT: 12.8 % (ref 11.5–17)
GFR SERPLBLD CREATININE-BSD FMLA CKD-EPI: >60 ML/MIN/1.73/M2
GLOBULIN SER-MCNC: 2.6 GM/DL (ref 2.4–3.5)
GLUCOSE SERPL-MCNC: 93 MG/DL (ref 82–115)
HCT VFR BLD AUTO: 42 % (ref 37–47)
HDLC SERPL-MCNC: 94 MG/DL (ref 35–60)
HGB BLD-MCNC: 13.8 G/DL (ref 12–16)
LDLC SERPL CALC-MCNC: 69 MG/DL (ref 50–140)
MCH RBC QN AUTO: 30.9 PG (ref 27–31)
MCHC RBC AUTO-ENTMCNC: 32.9 G/DL (ref 33–36)
MCV RBC AUTO: 94.2 FL (ref 80–94)
NRBC BLD AUTO-RTO: 0 %
PLATELET # BLD AUTO: 216 X10(3)/MCL (ref 130–400)
PMV BLD AUTO: 9.6 FL (ref 7.4–10.4)
POTASSIUM SERPL-SCNC: 3.7 MMOL/L (ref 3.5–5.1)
PROT SERPL-MCNC: 6.6 GM/DL (ref 5.8–7.6)
RBC # BLD AUTO: 4.46 X10(6)/MCL (ref 4.2–5.4)
SODIUM SERPL-SCNC: 146 MMOL/L (ref 136–145)
TRIGL SERPL-MCNC: 53 MG/DL (ref 37–140)
VLDLC SERPL CALC-MCNC: 11 MG/DL
WBC # BLD AUTO: 5.2 X10(3)/MCL (ref 4.5–11.5)

## 2025-06-24 PROCEDURE — 80053 COMPREHEN METABOLIC PANEL: CPT

## 2025-06-24 PROCEDURE — 83880 ASSAY OF NATRIURETIC PEPTIDE: CPT

## 2025-06-24 PROCEDURE — 36415 COLL VENOUS BLD VENIPUNCTURE: CPT

## 2025-06-24 PROCEDURE — 80061 LIPID PANEL: CPT

## 2025-06-24 PROCEDURE — 85027 COMPLETE CBC AUTOMATED: CPT

## 2025-07-23 ENCOUNTER — OFFICE VISIT (OUTPATIENT)
Dept: NEUROLOGY | Facility: CLINIC | Age: 70
End: 2025-07-23
Payer: MEDICARE

## 2025-07-23 VITALS
DIASTOLIC BLOOD PRESSURE: 80 MMHG | WEIGHT: 127 LBS | BODY MASS INDEX: 20.41 KG/M2 | HEIGHT: 66 IN | SYSTOLIC BLOOD PRESSURE: 128 MMHG

## 2025-07-23 DIAGNOSIS — G20.A1 PARKINSON'S DISEASE WITHOUT DYSKINESIA OR FLUCTUATING MANIFESTATIONS: Chronic | ICD-10-CM

## 2025-07-23 DIAGNOSIS — M48.02 SPINAL STENOSIS, CERVICAL REGION: ICD-10-CM

## 2025-07-23 DIAGNOSIS — R68.84 JAW PAIN: Primary | ICD-10-CM

## 2025-07-23 PROBLEM — F02.80 ALZHEIMER'S DISEASE: Chronic | Status: RESOLVED | Noted: 2023-03-15 | Resolved: 2025-07-23

## 2025-07-23 PROBLEM — G30.9 ALZHEIMER'S DISEASE: Chronic | Status: RESOLVED | Noted: 2023-03-15 | Resolved: 2025-07-23

## 2025-07-23 PROBLEM — F02.80 DEMENTIA DUE TO PARKINSON'S DISEASE: Status: RESOLVED | Noted: 2024-03-18 | Resolved: 2025-07-23

## 2025-07-23 PROCEDURE — 99213 OFFICE O/P EST LOW 20 MIN: CPT | Mod: PBBFAC | Performed by: PSYCHIATRY & NEUROLOGY

## 2025-07-23 PROCEDURE — 99214 OFFICE O/P EST MOD 30 MIN: CPT | Mod: S$PBB,,, | Performed by: PSYCHIATRY & NEUROLOGY

## 2025-07-23 PROCEDURE — 99999 PR PBB SHADOW E&M-EST. PATIENT-LVL III: CPT | Mod: PBBFAC,,, | Performed by: PSYCHIATRY & NEUROLOGY

## 2025-07-23 RX ORDER — LURASIDONE HYDROCHLORIDE 20 MG/1
20 TABLET, FILM COATED ORAL NIGHTLY
COMMUNITY
Start: 2025-07-14

## 2025-07-23 NOTE — PROGRESS NOTES
Chief Complaint   Patient presents with    F/U PD.      Occs B hand tremors.  C/O face, neck , jaw and back ache. Occs freezing-shuffled gait wo falls. Denies diff w speech, drooling or hallucinations. Diff w swallowing. Not sleeping well w vivid dreams. Lives alone, drive and is able to do all ADL's alone.         History of Present Illness    70 yo woman presents today for pain in face, neck, shoulder and back. She reports facial and jaw pain that occurs even when not chewing, described as an ache in the facial region. Pain is partially relieved by using a heating pad. She denies headaches, numbness, tingling in arms or face, and vision changes. She has a history of osteoarthritis, currently managed by Dr. Miranda. She specifically clarifies having osteoarthritis rather than rheumatoid arthritis. She previously trialed carbidopa-levodopa but discontinued due to lack of perceived therapeutic benefit.   Recall she had a pepito scan which was positive.      ROS:  General: -fever, -chills, -fatigue, -weight gain, -weight loss  Eyes: -vision changes, -redness, -discharge  ENT: -ear pain, -nasal congestion, -sore throat  Cardiovascular: -chest pain, -palpitations, -lower extremity edema  Respiratory: -cough, -shortness of breath  Gastrointestinal: -abdominal pain, -nausea, -vomiting, -diarrhea, -constipation, -blood in stool  Genitourinary: -dysuria, -hematuria, -frequency  Musculoskeletal: -joint pain, -muscle pain, +neck pain, +back pain  Skin: -rash, -lesion  Neurological: -headache, -dizziness, -numbness, -tingling  Psychiatric: -anxiety, -depression, -sleep difficulty  Head: +facial pain         Medication List with Changes/Refills   Current Medications    ALENDRONATE (FOSAMAX) 70 MG TABLET    Take 70 mg by mouth every 7 days.    ALPRAZOLAM (XANAX) 1 MG TABLET    Take 1 mg by mouth every evening.    ATORVASTATIN (LIPITOR) 10 MG TABLET    Take 1 tablet (10 mg total) by mouth once daily.    CARBIDOPA-LEVODOPA  MG  (SINEMET)  MG PER TABLET    1.5 tabs TID    CELECOXIB (CELEBREX) 100 MG CAPSULE    Take 100 mg by mouth daily as needed.    CYCLOBENZAPRINE (FLEXERIL) 10 MG TABLET    Take 1 tablet (10 mg total) by mouth 3 (three) times daily as needed for Muscle spasms.    DEXTROAMPHETAMINE-AMPHETAMINE (ADDERALL) 20 MG TABLET    Take 1 tablet by mouth 2 (two) times a day.    DONEPEZIL (ARICEPT) 10 MG TABLET    Take 1 tablet (10 mg total) by mouth nightly.    FUROSEMIDE (LASIX) 20 MG TABLET    Take 0.5-1 tablets (10-20 mg total) by mouth daily as needed (to prevent swelling).    GLIPIZIDE (GLUCOTROL) 10 MG TABLET    Take 1 tablet (10 mg total) by mouth 2 (two) times daily before meals.    LURASIDONE (LATUDA) 20 MG TAB TABLET    Take 20 mg by mouth every evening.    MAGNESIUM ORAL    Take 1 tablet by mouth Daily.    MELATONIN 10 MG TAB    Take 1 tablet by mouth every evening.    MEMANTINE (NAMENDA) 10 MG TAB    Take 1 tablet (10 mg total) by mouth once daily.    REXULTI 2 MG TAB    Take 1 tablet by mouth every evening.        Vitals:    07/23/25 1314   BP: 128/80            Cognition and Comprehension:  Speech and language intact  Follows commands    Cranial nerves:   CN 2_ Visual fields (full to confrontation both eyes)  CN 3, 4, 6_ Intact, DANA, no nystagmus  CN 5_facial tactile sensation intact  CN 7_no face asymmetry; normal eye closure and smile  CN 8_hearing intact to spoken voice  CN 9, 10, 11_voice normal, shoulder shrug ok; deltoids not fatigable   CN 12 tongue_protrudes mid line    Muscle Strength and Tone:  Normal upper extremity tone  Normal lower extremity tone  Normal upper extremity strength  Normal lower extremity strength  No bradykinesia noted in RUE, very mild bradykinesia in LUE  No rest tremor in hands noted today  BUE symmetric high frequency tremor with action  No rigidity BUE    Coordination and Gait:  Coordination and gait are normal     Assessment & Plan    M19.90 Unspecified osteoarthritis,  unspecified site  M26.609 Unspecified temporomandibular joint disorder, unspecified side  M54.2 Cervicalgia      OSTEOARTHRITIS AND JOINT PAIN:  - Assessed reported facial, neck, shoulder, and back pain.  - Patient to continue using heating pad for pain relief.  - Considered possibility of structural neck issue or inflammatory arthritic condition.  - Ordered blood test to check for inflammatory conditions (including test for temporal arteritis).  - Ruled out temporal arteritis based on absence of headaches and vision changes.    CERVICALGIA:  - Ordered MRI Neck.    PARKINSON'S DISEASE :  - currently not symptomatic but positive RAMIRO scan, hold sinemet      This note was generated with the assistance of ambient listening technology. Verbal consent was obtained by the patient and accompanying visitor(s) for the recording of patient appointment to facilitate this note. I attest to having reviewed and edited the generated note for accuracy, though some syntax or spelling errors may persist. Please contact the author of this note for any clarification.

## 2025-08-26 ENCOUNTER — TELEPHONE (OUTPATIENT)
Dept: FAMILY MEDICINE | Facility: CLINIC | Age: 70
End: 2025-08-26
Payer: MEDICARE

## 2025-08-27 ENCOUNTER — LAB VISIT (OUTPATIENT)
Dept: LAB | Facility: HOSPITAL | Age: 70
End: 2025-08-27
Attending: STUDENT IN AN ORGANIZED HEALTH CARE EDUCATION/TRAINING PROGRAM
Payer: MEDICARE

## 2025-08-27 ENCOUNTER — TELEPHONE (OUTPATIENT)
Dept: FAMILY MEDICINE | Facility: CLINIC | Age: 70
End: 2025-08-27
Payer: MEDICARE

## 2025-08-27 DIAGNOSIS — R73.09 OTHER ABNORMAL GLUCOSE: ICD-10-CM

## 2025-08-27 DIAGNOSIS — F33.0 MILD EPISODE OF RECURRENT MAJOR DEPRESSIVE DISORDER: ICD-10-CM

## 2025-08-27 DIAGNOSIS — M85.80 OSTEOPENIA, UNSPECIFIED LOCATION: Chronic | ICD-10-CM

## 2025-08-27 DIAGNOSIS — G30.9 ALZHEIMER'S DISEASE: ICD-10-CM

## 2025-08-27 DIAGNOSIS — F02.80 ALZHEIMER'S DISEASE: ICD-10-CM

## 2025-08-27 DIAGNOSIS — E78.2 MIXED HYPERLIPIDEMIA: ICD-10-CM

## 2025-08-27 LAB
25(OH)D3+25(OH)D2 SERPL-MCNC: 45 NG/ML (ref 30–80)
ALBUMIN SERPL-MCNC: 4 G/DL (ref 3.4–4.8)
ALBUMIN/GLOB SERPL: 1.5 RATIO (ref 1.1–2)
ALP SERPL-CCNC: 39 UNIT/L (ref 40–150)
ALT SERPL-CCNC: 23 UNIT/L (ref 0–55)
ANION GAP SERPL CALC-SCNC: 8 MEQ/L
AST SERPL-CCNC: 24 UNIT/L (ref 11–45)
BASOPHILS # BLD AUTO: 0.03 X10(3)/MCL
BASOPHILS NFR BLD AUTO: 0.7 %
BILIRUB SERPL-MCNC: 0.7 MG/DL
BUN SERPL-MCNC: 16.4 MG/DL (ref 9.8–20.1)
CALCIUM SERPL-MCNC: 9.6 MG/DL (ref 8.4–10.2)
CHLORIDE SERPL-SCNC: 103 MMOL/L (ref 98–107)
CHOLEST SERPL-MCNC: 165 MG/DL
CHOLEST/HDLC SERPL: 2 {RATIO} (ref 0–5)
CO2 SERPL-SCNC: 31 MMOL/L (ref 23–31)
CREAT SERPL-MCNC: 0.99 MG/DL (ref 0.55–1.02)
CREAT/UREA NIT SERPL: 17
EOSINOPHIL # BLD AUTO: 0.09 X10(3)/MCL (ref 0–0.9)
EOSINOPHIL NFR BLD AUTO: 2.2 %
ERYTHROCYTE [DISTWIDTH] IN BLOOD BY AUTOMATED COUNT: 12.8 % (ref 11.5–17)
EST. AVERAGE GLUCOSE BLD GHB EST-MCNC: 114 MG/DL
GFR SERPLBLD CREATININE-BSD FMLA CKD-EPI: >60 ML/MIN/1.73/M2
GLOBULIN SER-MCNC: 2.7 GM/DL (ref 2.4–3.5)
GLUCOSE SERPL-MCNC: 100 MG/DL (ref 82–115)
HBA1C MFR BLD: 5.6 %
HCT VFR BLD AUTO: 41.1 % (ref 37–47)
HDLC SERPL-MCNC: 93 MG/DL (ref 35–60)
HGB BLD-MCNC: 14 G/DL (ref 12–16)
IMM GRANULOCYTES # BLD AUTO: 0.01 X10(3)/MCL (ref 0–0.04)
IMM GRANULOCYTES NFR BLD AUTO: 0.2 %
LDLC SERPL CALC-MCNC: 61 MG/DL (ref 50–140)
LYMPHOCYTES # BLD AUTO: 2.19 X10(3)/MCL (ref 0.6–4.6)
LYMPHOCYTES NFR BLD AUTO: 52.9 %
MCH RBC QN AUTO: 31.6 PG (ref 27–31)
MCHC RBC AUTO-ENTMCNC: 34.1 G/DL (ref 33–36)
MCV RBC AUTO: 92.8 FL (ref 80–94)
MONOCYTES # BLD AUTO: 0.31 X10(3)/MCL (ref 0.1–1.3)
MONOCYTES NFR BLD AUTO: 7.5 %
NEUTROPHILS # BLD AUTO: 1.51 X10(3)/MCL (ref 2.1–9.2)
NEUTROPHILS NFR BLD AUTO: 36.5 %
NRBC BLD AUTO-RTO: 0 %
PLATELET # BLD AUTO: 222 X10(3)/MCL (ref 130–400)
PMV BLD AUTO: 9.3 FL (ref 7.4–10.4)
POTASSIUM SERPL-SCNC: 3.6 MMOL/L (ref 3.5–5.1)
PROT SERPL-MCNC: 6.7 GM/DL (ref 5.8–7.6)
RBC # BLD AUTO: 4.43 X10(6)/MCL (ref 4.2–5.4)
SODIUM SERPL-SCNC: 142 MMOL/L (ref 136–145)
TRIGL SERPL-MCNC: 54 MG/DL (ref 37–140)
TSH SERPL-ACNC: 1.73 UIU/ML (ref 0.35–4.94)
VLDLC SERPL CALC-MCNC: 11 MG/DL
WBC # BLD AUTO: 4.14 X10(3)/MCL (ref 4.5–11.5)

## 2025-08-27 PROCEDURE — 84443 ASSAY THYROID STIM HORMONE: CPT

## 2025-08-27 PROCEDURE — 36415 COLL VENOUS BLD VENIPUNCTURE: CPT

## 2025-08-27 PROCEDURE — 82306 VITAMIN D 25 HYDROXY: CPT

## 2025-08-27 PROCEDURE — 83036 HEMOGLOBIN GLYCOSYLATED A1C: CPT

## 2025-08-27 PROCEDURE — 85025 COMPLETE CBC W/AUTO DIFF WBC: CPT

## 2025-08-27 PROCEDURE — 80061 LIPID PANEL: CPT

## 2025-08-27 PROCEDURE — 80053 COMPREHEN METABOLIC PANEL: CPT

## 2025-09-03 ENCOUNTER — OFFICE VISIT (OUTPATIENT)
Dept: FAMILY MEDICINE | Facility: CLINIC | Age: 70
End: 2025-09-03
Payer: MEDICARE

## 2025-09-03 VITALS
BODY MASS INDEX: 20.83 KG/M2 | OXYGEN SATURATION: 97 % | DIASTOLIC BLOOD PRESSURE: 69 MMHG | SYSTOLIC BLOOD PRESSURE: 139 MMHG | RESPIRATION RATE: 18 BRPM | HEART RATE: 51 BPM | TEMPERATURE: 98 F | HEIGHT: 65 IN | WEIGHT: 125 LBS

## 2025-09-03 DIAGNOSIS — F31.9 BIPOLAR I DISORDER: ICD-10-CM

## 2025-09-03 DIAGNOSIS — R41.89 COGNITIVE IMPAIRMENT: Chronic | ICD-10-CM

## 2025-09-03 DIAGNOSIS — Z00.00 WELLNESS EXAMINATION: ICD-10-CM

## 2025-09-03 DIAGNOSIS — Z13.29 SCREENING FOR THYROID DISORDER: ICD-10-CM

## 2025-09-03 DIAGNOSIS — M50.30 DEGENERATION OF INTERVERTEBRAL DISC OF CERVICAL REGION: Chronic | ICD-10-CM

## 2025-09-03 DIAGNOSIS — G20.A1 PARKINSON'S DISEASE WITHOUT DYSKINESIA OR FLUCTUATING MANIFESTATIONS: Chronic | ICD-10-CM

## 2025-09-03 DIAGNOSIS — F41.1 GENERALIZED ANXIETY DISORDER: Chronic | ICD-10-CM

## 2025-09-03 DIAGNOSIS — I25.10 ARTERIOSCLEROSIS OF CORONARY ARTERY: Chronic | ICD-10-CM

## 2025-09-03 DIAGNOSIS — Z00.00 WELL ADULT HEALTH CHECK: ICD-10-CM

## 2025-09-03 DIAGNOSIS — Z12.31 BREAST CANCER SCREENING BY MAMMOGRAM: ICD-10-CM

## 2025-09-03 DIAGNOSIS — I27.21 SECONDARY PULMONARY ARTERIAL HYPERTENSION: ICD-10-CM

## 2025-09-03 DIAGNOSIS — M79.10 MYALGIA: ICD-10-CM

## 2025-09-03 DIAGNOSIS — Z78.0 POST-MENOPAUSAL: Chronic | ICD-10-CM
